# Patient Record
Sex: MALE | Race: WHITE | NOT HISPANIC OR LATINO | Employment: FULL TIME | URBAN - METROPOLITAN AREA
[De-identification: names, ages, dates, MRNs, and addresses within clinical notes are randomized per-mention and may not be internally consistent; named-entity substitution may affect disease eponyms.]

---

## 2023-10-11 ENCOUNTER — APPOINTMENT (OUTPATIENT)
Dept: PREADMISSION TESTING | Facility: HOSPITAL | Age: 53
End: 2023-10-11

## 2023-11-03 ENCOUNTER — TELEMEDICINE CLINICAL SUPPORT (OUTPATIENT)
Dept: PREADMISSION TESTING | Facility: HOSPITAL | Age: 53
End: 2023-11-03
Payer: MEDICAID

## 2023-11-13 RX ORDER — METOPROLOL SUCCINATE 200 MG/1
200 TABLET, EXTENDED RELEASE ORAL DAILY
COMMUNITY

## 2023-11-13 RX ORDER — LISINOPRIL AND HYDROCHLOROTHIAZIDE 12.5; 2 MG/1; MG/1
1 TABLET ORAL DAILY
COMMUNITY

## 2023-11-13 RX ORDER — ASPIRIN 81 MG/1
81 TABLET ORAL
COMMUNITY
Start: 2020-04-18

## 2023-11-13 RX ORDER — PANTOPRAZOLE SODIUM 40 MG/1
40 TABLET, DELAYED RELEASE ORAL
COMMUNITY

## 2023-11-13 RX ORDER — EZETIMIBE 10 MG/1
10 TABLET ORAL DAILY
COMMUNITY

## 2023-11-13 RX ORDER — ATORVASTATIN CALCIUM 80 MG/1
80 TABLET, FILM COATED ORAL NIGHTLY
COMMUNITY

## 2023-11-15 ENCOUNTER — PRE-ADMISSION TESTING (OUTPATIENT)
Dept: PREADMISSION TESTING | Facility: HOSPITAL | Age: 53
End: 2023-11-15
Payer: MEDICAID

## 2023-11-15 ENCOUNTER — HOSPITAL ENCOUNTER (OUTPATIENT)
Dept: CARDIOLOGY | Facility: HOSPITAL | Age: 53
Discharge: HOME | End: 2023-11-15
Payer: MEDICAID

## 2023-11-15 VITALS
BODY MASS INDEX: 24.92 KG/M2 | TEMPERATURE: 98.2 F | WEIGHT: 178 LBS | HEIGHT: 71 IN | HEART RATE: 68 BPM | SYSTOLIC BLOOD PRESSURE: 105 MMHG | DIASTOLIC BLOOD PRESSURE: 73 MMHG

## 2023-11-15 DIAGNOSIS — M27.2 OSTEOMYELITIS OF MANDIBLE: Primary | ICD-10-CM

## 2023-11-15 DIAGNOSIS — M27.2 OSTEOMYELITIS OF MANDIBLE: ICD-10-CM

## 2023-11-15 LAB
ABO GROUP (TYPE) IN BLOOD: NORMAL
ANION GAP SERPL CALC-SCNC: 15 MMOL/L (ref 10–20)
ANTIBODY SCREEN: NORMAL
BUN SERPL-MCNC: 8 MG/DL (ref 6–23)
CALCIUM SERPL-MCNC: 9.3 MG/DL (ref 8.6–10.6)
CHLORIDE SERPL-SCNC: 103 MMOL/L (ref 98–107)
CO2 SERPL-SCNC: 27 MMOL/L (ref 21–32)
CREAT SERPL-MCNC: 0.77 MG/DL (ref 0.5–1.3)
ERYTHROCYTE [DISTWIDTH] IN BLOOD BY AUTOMATED COUNT: 14.6 % (ref 11.5–14.5)
GFR SERPL CREATININE-BSD FRML MDRD: >90 ML/MIN/1.73M*2
GLUCOSE SERPL-MCNC: 81 MG/DL (ref 74–99)
HCT VFR BLD AUTO: 40.6 % (ref 41–52)
HGB BLD-MCNC: 13.2 G/DL (ref 13.5–17.5)
MCH RBC QN AUTO: 32.1 PG (ref 26–34)
MCHC RBC AUTO-ENTMCNC: 32.5 G/DL (ref 32–36)
MCV RBC AUTO: 99 FL (ref 80–100)
NRBC BLD-RTO: 0 /100 WBCS (ref 0–0)
PLATELET # BLD AUTO: 265 X10*3/UL (ref 150–450)
POTASSIUM SERPL-SCNC: 3.8 MMOL/L (ref 3.5–5.3)
RBC # BLD AUTO: 4.11 X10*6/UL (ref 4.5–5.9)
RH FACTOR (ANTIGEN D): NORMAL
SODIUM SERPL-SCNC: 141 MMOL/L (ref 136–145)
WBC # BLD AUTO: 4.6 X10*3/UL (ref 4.4–11.3)

## 2023-11-15 PROCEDURE — 86900 BLOOD TYPING SEROLOGIC ABO: CPT

## 2023-11-15 PROCEDURE — 93010 ELECTROCARDIOGRAM REPORT: CPT | Performed by: INTERNAL MEDICINE

## 2023-11-15 PROCEDURE — 36415 COLL VENOUS BLD VENIPUNCTURE: CPT

## 2023-11-15 PROCEDURE — 93005 ELECTROCARDIOGRAM TRACING: CPT

## 2023-11-15 PROCEDURE — 99205 OFFICE O/P NEW HI 60 MIN: CPT | Performed by: NURSE PRACTITIONER

## 2023-11-15 PROCEDURE — 85027 COMPLETE CBC AUTOMATED: CPT

## 2023-11-15 PROCEDURE — 80048 BASIC METABOLIC PNL TOTAL CA: CPT

## 2023-11-15 ASSESSMENT — LIFESTYLE VARIABLES: SMOKING_STATUS: SMOKER

## 2023-11-15 ASSESSMENT — ENCOUNTER SYMPTOMS
CARDIOVASCULAR NEGATIVE: 1
ENDOCRINE NEGATIVE: 1
GASTROINTESTINAL NEGATIVE: 1
NEUROLOGICAL NEGATIVE: 1
COUGH: 1
CONSTITUTIONAL NEGATIVE: 1
MUSCULOSKELETAL NEGATIVE: 1
NECK NEGATIVE: 1

## 2023-11-15 ASSESSMENT — CHADS2 SCORE
HYPERTENSION: YES
AGE GREATER THAN OR EQUAL TO 75: NO
DIABETES: NO
PRIOR STROKE OR TIA OR THROMBOEMBOLISM: NO
CHF: YES
CHADS2 SCORE: 2

## 2023-11-15 ASSESSMENT — DUKE ACTIVITY SCORE INDEX (DASI)
DASI METS SCORE: 9.9
CAN YOU DO YARD WORK LIKE RAKING LEAVES, WEEDING OR PUSHING A MOWER: YES
CAN YOU DO HEAVY WORK AROUND THE HOUSE LIKE SCRUBBING FLOORS OR LIFTING AND MOVING HEAVY FURNITURE: YES
CAN YOU WALK A BLOCK OR TWO ON LEVEL GROUND: YES
CAN YOU HAVE SEXUAL RELATIONS: YES
CAN YOU DO LIGHT WORK AROUND THE HOUSE LIKE DUSTING OR WASHING DISHES: YES
CAN YOU TAKE CARE OF YOURSELF (EAT, DRESS, BATHE, OR USE TOILET): YES
CAN YOU WALK INDOORS, SUCH AS AROUND YOUR HOUSE: YES
CAN YOU PARTICIPATE IN MODERATE RECREATIONAL ACTIVITIES LIKE GOLF, BOWLING, DANCING, DOUBLES TENNIS OR THROWING A BASEBALL OR FOOTBALL: YES
CAN YOU PARTICIPATE IN STRENOUS SPORTS LIKE SWIMMING, SINGLES TENNIS, FOOTBALL, BASKETBALL, OR SKIING: YES
CAN YOU CLIMB A FLIGHT OF STAIRS OR WALK UP A HILL: YES
CAN YOU RUN A SHORT DISTANCE: YES
CAN YOU DO MODERATE WORK AROUND THE HOUSE LIKE VACUUMING, SWEEPING FLOORS OR CARRYING GROCERIES: YES
TOTAL_SCORE: 58.2

## 2023-11-15 NOTE — CPM/PAT H&P
CPM/PAT Evaluation       Name: Steven Olivia Jr. (Steven Olivia Jr.)  /Age: 1970/52 y.o.     Visit Type:   In-Person       Chief Complaint: Osteomyelitis, jaw chronic    HPI  Patient is a 52-year-old male with a past medical history significant for HTN, HLD, CAD status post CABG, MI in , decompensated heart failure, hearing loss, and osteomyelitis of the jaw.  Patient is being evaluated in CPM in anticipation of Debridement of mandible and placement of custom reconstruction plate with Dr. Marinelli on 23.    Past Medical History:   Diagnosis Date    Alcoholism (CMS/[facility])     Cardiomyopathy (CMS/[facility]) 2020    Carotid stenosis 2020    CHF (congestive heart failure) (CMS/[facility])     Coronary artery disease 2020    s/p CABGx3, Last ECG: Sinus Rhythm WNL on 2023 by Dr. Lei Pathak    HL (hearing loss)     Hyperlipidemia     Hypertension     ICAO (internal carotid artery occlusion), right     right ICA occlusion, 50-59% stenosis of left ICA    Myocardial infarction (CMS/[facility])         Vision loss     glasses       Past Surgical History:   Procedure Laterality Date    CARDIAC CATHETERIZATION      2022 and 2020    COLECTOMY      COLONOSCOPY      CORONARY ARTERY BYPASS GRAFT  2020    CABGx3 with Dr. Sanchez       Patient Sexual activity questions deferred to the physician.    Family History   Problem Relation Name Age of Onset    Heart disease Mother      Heart disease Father         No Known Allergies    Prior to Admission medications    Medication Sig Start Date End Date Taking? Authorizing Provider   aspirin 81 mg EC tablet Take 1 tablet (81 mg) by mouth once daily. 20  Yes Historical Provider, MD   atorvastatin (Lipitor) 80 mg tablet Take 1 tablet (80 mg) by mouth once daily at bedtime.   Yes Historical Provider, MD   ezetimibe (Zetia) 10 mg tablet Take 1 tablet (10 mg) by mouth once daily.   Yes Historical Provider, MD   lisinopriL-hydrochlorothiazide 20-12.5 mg tablet  Take 1 tablet by mouth once daily.   Yes Historical Provider, MD   metoprolol succinate XL (Toprol-XL) 200 mg 24 hr tablet Take 1 tablet (200 mg) by mouth once daily. DO NOT CRUSH OR CHEW   Yes Historical Provider, MD   pantoprazole (ProtoNix) 40 mg EC tablet Take 1 tablet (40 mg) by mouth once daily in the morning. Take before meals.   Yes Historical Provider, MD        PAT ROS:   Constitutional:   neg    Neuro/Psych:   neg    Eyes:    use of corrective lenses  Ears:    hearing loss  Nose:   neg    Mouth:    dental issues  Throat:   neg    Neck:   neg    Cardio:   neg    Respiratory:    cough  Endocrine:   neg    GI:   neg    :   neg    Musculoskeletal:   neg    Hematologic:   neg    Skin:  neg        Physical Exam  Vitals reviewed. Physical exam within normal limits.          PAT AIRWAY:   Airway:     Mallampati::  II    TM distance::  <3 FB    Neck ROM::  Full      Visit Vitals  /73   Pulse 68   Temp 36.8 °C (98.2 °F)       DASI Risk Score      Flowsheet Row Most Recent Value   DASI SCORE 58.2   METS Score (Will be calculated only when all the questions are answered) 9.9          Caprini DVT Assessment      Flowsheet Row Most Recent Value   DVT Score 5   Current Status Major surgery planned, including arthroscopic and laproscopic (1-2 hours)   History Prior major surgery   Age 40-59 years   BMI 30 or less          Modified Frailty Index    No data to display       CHADS2 Stroke Risk  Current as of 15 minutes ago        2.8% 3 - 100%: High Risk   2 - 3%: Medium Risk   0 - 2%: Low Risk     No Change          This score determines the patient's risk of having a stroke if the patient has atrial fibrillation.          Points Metrics   0 Has Congestive Heart Failure:  No     Patients with congestive heart failure get 1 point.    Current as of 15 minutes ago   1 Has Hypertension:  Yes     Patients with hypertension get 1 point.    Current as of 15 minutes ago   0 Age:  52     Patients who are 75 years of age or  older get 1 point.    Current as of 15 minutes ago   0 Has Diabetes:  No     Patients with diabetes get 1 point.    Current as of 15 minutes ago   0 Had Stroke:  No  Had TIA:  No  Had Thromboembolism:  No     Patients who have had a stroke, TIA, or thromboembolism get 2 points.    Current as of 15 minutes ago             Revised Cardiac Risk Index      Flowsheet Row Most Recent Value   Revised Cardiac Risk Calculator 2          Apfel Simplified Score      Flowsheet Row Most Recent Value   Apfel Simplified Score Calculator 0          Risk Analysis Index Results This Encounter    No data found in the last 1 encounters.       Stop Bang Score      Flowsheet Row Most Recent Value   Do you snore loudly? 0   Do you often feel tired or fatigued after your sleep? 0   Has anyone ever observed you stop breathing in your sleep? 0   Do you have or are you being treated for high blood pressure? 1   Recent BMI (Calculated) 24.8   Is BMI greater than 35 kg/m2? 0=No   Age older than 50 years old? 1=Yes   Is your neck circumference greater than 17 inches (Male) or 16 inches (Female)? 0   Gender - Male 1=Yes   STOP-BANG Total Score 3            Assessment and Plan:     Neuro:   The patient has no neurological diagnoses or significant findings on chart review, clinical presentation, and evaluation.  No grossly apparent perioperative risk.    HEENT/Airway  No diagnoses, significant findings on chart review, clinical presentation, or evaluation.    Cardiovascular  The patient is scheduled for non-cardiac surgery associated with elevated risk.  The patient has no major cardiac contraindications to non- cardiac surgery.  RCRI  The patient meets 3 or more RCRI criteria and therefore is at high risk for major adverse cardiac complications.  METS  The patient's functional capacity capacity is greater than 4 METS.  EKG  The patient has no EKG or echocardiographic changes concerning for myocardial ischemia.  No further cardiac evaluation is  indicated  Heart Failure  The patient has no known history of heart failure.  Additionally, the patient reports no symptoms of heart failure and demonstrates no signs of heart failure.  Hypertension Evaluation  The patient has a known history of hypertension that is controlled.  Patient's hypertension is most consistent with stage 1.  Heart Rhythm Evaluation  The patient has no history of arrhythmias.  Heart Valve Evaluation  The patient has no known history of valvular heart disease. The patient has no symptoms or physical exam findings to suggest valvular heart disease.  CARDS EVAL  The patient follows with cardiology, Dr. Pathak. Patient was last seen 7-26-23. Per note, continue beta blocker in the perioperative period, continue statin therapy and aspirin follow up in 1 year.  The patient has a 30-day risk for MACE of 3 or greater predictors, 15% risk for cardiac death, nonfatal myocardial infarction, and nonfatal cardiac arrest.  CALEB score which indicates a 0.1% risk of intraoperative or 30-day postoperative.    Pulmonary   No significant findings on chart review or clinical presentation and evaluation.  The patient has a stop bang score of 3, which places patient at intermediate risk for having FRANKLIN.  ARISCAT 19, low, 1.6% risk of in-hospital postoperative pulmonary complications  PRODIGY 8, low of respiratory depression episode.    Hematology  No diagnoses or significant findings on chart review or clinical presentation and evaluation.  Antiplatelet management   The patient is currently receiving antiplatelet therapy for CAD.  Anticoagulation management  The patient is not currently receiving anticoagulation therapy.  Caprini score 5, high risk of VTE  Transfusion Evaluation  A type and screen was obtained given the likelihood for perioperative transfusion of blood or blood products.    GI  No diagnoses or significant findings on chart review or clinical presentation and evaluation.  Eat 10- 0,  self-perceived  oropharyngeal dysphagia scale (0-40)     Genitourinary  No diagnoses or significant findings on chart review or clinical presentation and evaluation.    Renal  The patient has no known history of chronic kidney disease.    Musculoskeletal  No diagnoses or significant findings on chart review or clinical presentation and evaluation.    Endocrine  Diabetes Evaluation  The patient has no history of diabetes mellitus  Thyroid Disease Evaluation  The patient has no history of thyroid disease.    ID  No diagnoses or significant findings on chart review or clinical presentation and evaluation.    -Preoperative medication instructions were provided and reviewed with the patient.  Any additional testing or evaluation was explained to the patient.  NPO Instructions were discussed, and the patient's questions were answered prior to conclusion of this encounter

## 2023-11-15 NOTE — PREPROCEDURE INSTRUCTIONS
NPO Instructions:    Do not eat any food after midnight the night before your surgery/procedure.  You may have clear liquids until TWO hours before surgery/procedure. This includes water, black tea/coffee, (no milk or cream) apple juice and electrolyte drinks (Gatorade).  You may chew gum up to TWO hours before your surgery/procedure.    Additional Instructions:     The Day before Surgery:  Review your medication instructions, stop indicated medications  You will be contacted regarding the time of your arrival to facility and surgery time  Do not eat any food after Midnight  Day of Surgery:  Review your medication instructions, take indicated medications  You may have clear liquids until TWO hours before surgery/procedure.  This includes water, black tea/coffee, (no milk or cream) apple juice and electrolyte drinks (Gatorade)  You may chew gum up to TWO hours before your surgery/procedure  Wear  comfortable loose fitting clothing  Do not use moisturizers, creams, lotions or perfume  All jewelry and valuables should be left at home

## 2023-11-16 ENCOUNTER — ANESTHESIA (OUTPATIENT)
Dept: OPERATING ROOM | Facility: HOSPITAL | Age: 53
End: 2023-11-16
Payer: MEDICAID

## 2023-11-16 ENCOUNTER — HOSPITAL ENCOUNTER (OUTPATIENT)
Facility: HOSPITAL | Age: 53
LOS: 1 days | Discharge: HOME | End: 2023-11-17
Attending: DENTIST | Admitting: DENTIST
Payer: MEDICAID

## 2023-11-16 ENCOUNTER — APPOINTMENT (OUTPATIENT)
Dept: RADIOLOGY | Facility: HOSPITAL | Age: 53
End: 2023-11-16
Payer: MEDICAID

## 2023-11-16 ENCOUNTER — ANESTHESIA EVENT (OUTPATIENT)
Dept: OPERATING ROOM | Facility: HOSPITAL | Age: 53
End: 2023-11-16
Payer: MEDICAID

## 2023-11-16 DIAGNOSIS — M27.2 OSTEOMYELITIS, JAW CHRONIC: Primary | ICD-10-CM

## 2023-11-16 PROBLEM — I25.10 CAD (CORONARY ARTERY DISEASE): Status: ACTIVE | Noted: 2023-11-16

## 2023-11-16 PROBLEM — I65.29 CAROTID STENOSIS: Status: ACTIVE | Noted: 2023-11-16

## 2023-11-16 PROBLEM — I10 PRIMARY HYPERTENSION: Status: ACTIVE | Noted: 2023-11-16

## 2023-11-16 PROBLEM — I42.9 CARDIOMYOPATHY (MULTI): Status: ACTIVE | Noted: 2023-11-16

## 2023-11-16 PROBLEM — I21.9 MYOCARDIAL INFARCTION (MULTI): Status: ACTIVE | Noted: 2023-11-16

## 2023-11-16 PROBLEM — I65.29 STENOSIS OF CAROTID ARTERY: Status: ACTIVE | Noted: 2023-11-16

## 2023-11-16 PROBLEM — F10.20 ALCOHOLISM (MULTI): Status: ACTIVE | Noted: 2023-11-16

## 2023-11-16 PROBLEM — I25.10 CORONARY ARTERY DISEASE: Status: ACTIVE | Noted: 2023-11-16

## 2023-11-16 PROBLEM — I50.9 CHF (CONGESTIVE HEART FAILURE) (MULTI): Status: ACTIVE | Noted: 2023-11-16

## 2023-11-16 PROBLEM — E78.5 HYPERLIPIDEMIA: Status: ACTIVE | Noted: 2023-11-16

## 2023-11-16 PROBLEM — I10 HYPERTENSION: Status: ACTIVE | Noted: 2023-11-16

## 2023-11-16 PROBLEM — H54.7 VISION LOSS: Status: ACTIVE | Noted: 2023-11-16

## 2023-11-16 LAB
ATRIAL RATE: 64 BPM
P AXIS: 72 DEGREES
P OFFSET: 202 MS
P ONSET: 153 MS
PR INTERVAL: 150 MS
Q ONSET: 228 MS
QRS COUNT: 11 BEATS
QRS DURATION: 78 MS
QT INTERVAL: 428 MS
QTC CALCULATION(BAZETT): 441 MS
QTC FREDERICIA: 437 MS
R AXIS: 68 DEGREES
T AXIS: 52 DEGREES
T OFFSET: 442 MS
VENTRICULAR RATE: 64 BPM

## 2023-11-16 PROCEDURE — A4217 STERILE WATER/SALINE, 500 ML: HCPCS | Mod: SE | Performed by: DENTIST

## 2023-11-16 PROCEDURE — 70355 PANORAMIC X-RAY OF JAWS: CPT | Mod: FY

## 2023-11-16 PROCEDURE — 2500000004 HC RX 250 GENERAL PHARMACY W/ HCPCS (ALT 636 FOR OP/ED)

## 2023-11-16 PROCEDURE — 88312 SPECIAL STAINS GROUP 1: CPT | Mod: TC,SUR | Performed by: DENTIST

## 2023-11-16 PROCEDURE — 1100000001 HC PRIVATE ROOM DAILY

## 2023-11-16 PROCEDURE — 2500000004 HC RX 250 GENERAL PHARMACY W/ HCPCS (ALT 636 FOR OP/ED): Mod: SE | Performed by: DENTIST

## 2023-11-16 PROCEDURE — 88305 TISSUE EXAM BY PATHOLOGIST: CPT | Performed by: PATHOLOGY

## 2023-11-16 PROCEDURE — 2500000001 HC RX 250 WO HCPCS SELF ADMINISTERED DRUGS (ALT 637 FOR MEDICARE OP): Mod: SE | Performed by: DENTIST

## 2023-11-16 PROCEDURE — 2500000001 HC RX 250 WO HCPCS SELF ADMINISTERED DRUGS (ALT 637 FOR MEDICARE OP): Performed by: STUDENT IN AN ORGANIZED HEALTH CARE EDUCATION/TRAINING PROGRAM

## 2023-11-16 PROCEDURE — 70355 PANORAMIC X-RAY OF JAWS: CPT | Performed by: STUDENT IN AN ORGANIZED HEALTH CARE EDUCATION/TRAINING PROGRAM

## 2023-11-16 PROCEDURE — 88311 DECALCIFY TISSUE: CPT | Performed by: PATHOLOGY

## 2023-11-16 PROCEDURE — 3700000001 HC GENERAL ANESTHESIA TIME - INITIAL BASE CHARGE: Performed by: DENTIST

## 2023-11-16 PROCEDURE — 2500000004 HC RX 250 GENERAL PHARMACY W/ HCPCS (ALT 636 FOR OP/ED): Mod: SE | Performed by: ANESTHESIOLOGIST ASSISTANT

## 2023-11-16 PROCEDURE — 88307 TISSUE EXAM BY PATHOLOGIST: CPT | Mod: TC,SUR | Performed by: DENTIST

## 2023-11-16 PROCEDURE — 94640 AIRWAY INHALATION TREATMENT: CPT | Mod: 59 | Performed by: ANESTHESIOLOGIST ASSISTANT

## 2023-11-16 PROCEDURE — 3600000008 HC OR TIME - EACH INCREMENTAL 1 MINUTE - PROCEDURE LEVEL THREE: Performed by: DENTIST

## 2023-11-16 PROCEDURE — 87070 CULTURE OTHR SPECIMN AEROBIC: CPT | Performed by: DENTIST

## 2023-11-16 PROCEDURE — 2780000003 HC OR 278 NO HCPCS: Performed by: DENTIST

## 2023-11-16 PROCEDURE — C1776 JOINT DEVICE (IMPLANTABLE): HCPCS | Performed by: DENTIST

## 2023-11-16 PROCEDURE — 7100000001 HC RECOVERY ROOM TIME - INITIAL BASE CHARGE: Performed by: DENTIST

## 2023-11-16 PROCEDURE — 2500000005 HC RX 250 GENERAL PHARMACY W/O HCPCS

## 2023-11-16 PROCEDURE — 88307 TISSUE EXAM BY PATHOLOGIST: CPT | Performed by: PATHOLOGY

## 2023-11-16 PROCEDURE — 2500000005 HC RX 250 GENERAL PHARMACY W/O HCPCS: Mod: SE | Performed by: ANESTHESIOLOGIST ASSISTANT

## 2023-11-16 PROCEDURE — 7100000002 HC RECOVERY ROOM TIME - EACH INCREMENTAL 1 MINUTE: Performed by: DENTIST

## 2023-11-16 PROCEDURE — 2500000002 HC RX 250 W HCPCS SELF ADMINISTERED DRUGS (ALT 637 FOR MEDICARE OP, ALT 636 FOR OP/ED): Mod: SE | Performed by: ANESTHESIOLOGIST ASSISTANT

## 2023-11-16 PROCEDURE — 87075 CULTR BACTERIA EXCEPT BLOOD: CPT | Performed by: DENTIST

## 2023-11-16 PROCEDURE — A4649 SURGICAL SUPPLIES: HCPCS | Performed by: DENTIST

## 2023-11-16 PROCEDURE — 2500000004 HC RX 250 GENERAL PHARMACY W/ HCPCS (ALT 636 FOR OP/ED): Performed by: STUDENT IN AN ORGANIZED HEALTH CARE EDUCATION/TRAINING PROGRAM

## 2023-11-16 PROCEDURE — 2720000007 HC OR 272 NO HCPCS: Performed by: DENTIST

## 2023-11-16 PROCEDURE — 3700000002 HC GENERAL ANESTHESIA TIME - EACH INCREMENTAL 1 MINUTE: Performed by: DENTIST

## 2023-11-16 PROCEDURE — 2500000004 HC RX 250 GENERAL PHARMACY W/ HCPCS (ALT 636 FOR OP/ED): Performed by: ANESTHESIOLOGY

## 2023-11-16 PROCEDURE — 88305 TISSUE EXAM BY PATHOLOGIST: CPT | Mod: TC,SUR | Performed by: DENTIST

## 2023-11-16 PROCEDURE — C1713 ANCHOR/SCREW BN/BN,TIS/BN: HCPCS | Performed by: DENTIST

## 2023-11-16 PROCEDURE — 3600000003 HC OR TIME - INITIAL BASE CHARGE - PROCEDURE LEVEL THREE: Performed by: DENTIST

## 2023-11-16 DEVICE — IMPLANTABLE DEVICE: Type: IMPLANTABLE DEVICE | Site: MANDIBLE | Status: FUNCTIONAL

## 2023-11-16 DEVICE — IMPLANTABLE DEVICE: Type: IMPLANTABLE DEVICE | Site: MANDIBLE | Status: NON-FUNCTIONAL

## 2023-11-16 DEVICE — DRILL, KLSM 2.2: Type: IMPLANTABLE DEVICE | Site: MANDIBLE | Status: NON-FUNCTIONAL

## 2023-11-16 RX ORDER — NALOXONE HYDROCHLORIDE 0.4 MG/ML
0.2 INJECTION, SOLUTION INTRAMUSCULAR; INTRAVENOUS; SUBCUTANEOUS EVERY 5 MIN PRN
Status: DISCONTINUED | OUTPATIENT
Start: 2023-11-16 | End: 2023-11-17 | Stop reason: HOSPADM

## 2023-11-16 RX ORDER — NORETHINDRONE AND ETHINYL ESTRADIOL 0.5-0.035
KIT ORAL AS NEEDED
Status: DISCONTINUED | OUTPATIENT
Start: 2023-11-16 | End: 2023-11-16

## 2023-11-16 RX ORDER — HYDROMORPHONE HYDROCHLORIDE 1 MG/ML
INJECTION, SOLUTION INTRAMUSCULAR; INTRAVENOUS; SUBCUTANEOUS AS NEEDED
Status: DISCONTINUED | OUTPATIENT
Start: 2023-11-16 | End: 2023-11-16

## 2023-11-16 RX ORDER — ASPIRIN 81 MG/1
81 TABLET ORAL
Status: DISCONTINUED | OUTPATIENT
Start: 2023-11-17 | End: 2023-11-17 | Stop reason: HOSPADM

## 2023-11-16 RX ORDER — FENTANYL CITRATE 50 UG/ML
INJECTION, SOLUTION INTRAMUSCULAR; INTRAVENOUS AS NEEDED
Status: DISCONTINUED | OUTPATIENT
Start: 2023-11-16 | End: 2023-11-16

## 2023-11-16 RX ORDER — MIDAZOLAM HYDROCHLORIDE 1 MG/ML
INJECTION INTRAMUSCULAR; INTRAVENOUS AS NEEDED
Status: DISCONTINUED | OUTPATIENT
Start: 2023-11-16 | End: 2023-11-16

## 2023-11-16 RX ORDER — ENOXAPARIN SODIUM 100 MG/ML
40 INJECTION SUBCUTANEOUS
Status: DISCONTINUED | OUTPATIENT
Start: 2023-11-17 | End: 2023-11-17 | Stop reason: HOSPADM

## 2023-11-16 RX ORDER — SODIUM CHLORIDE 9 MG/ML
INJECTION, SOLUTION INTRAVENOUS CONTINUOUS PRN
Status: COMPLETED | OUTPATIENT
Start: 2023-11-16 | End: 2023-11-16

## 2023-11-16 RX ORDER — SUCCINYLCHOLINE CHLORIDE 20 MG/ML
INJECTION INTRAMUSCULAR; INTRAVENOUS AS NEEDED
Status: DISCONTINUED | OUTPATIENT
Start: 2023-11-16 | End: 2023-11-16

## 2023-11-16 RX ORDER — CEFAZOLIN SODIUM 2 G/50ML
SOLUTION INTRAVENOUS AS NEEDED
Status: DISCONTINUED | OUTPATIENT
Start: 2023-11-16 | End: 2023-11-16

## 2023-11-16 RX ORDER — ATORVASTATIN CALCIUM 80 MG/1
80 TABLET, FILM COATED ORAL NIGHTLY
Status: DISCONTINUED | OUTPATIENT
Start: 2023-11-16 | End: 2023-11-17 | Stop reason: HOSPADM

## 2023-11-16 RX ORDER — OXYCODONE HYDROCHLORIDE 5 MG/1
10 TABLET ORAL EVERY 4 HOURS PRN
Status: DISCONTINUED | OUTPATIENT
Start: 2023-11-16 | End: 2023-11-17 | Stop reason: HOSPADM

## 2023-11-16 RX ORDER — LIDOCAINE HYDROCHLORIDE 10 MG/ML
0.1 INJECTION, SOLUTION EPIDURAL; INFILTRATION; INTRACAUDAL; PERINEURAL ONCE
Status: DISCONTINUED | OUTPATIENT
Start: 2023-11-16 | End: 2023-11-16 | Stop reason: HOSPADM

## 2023-11-16 RX ORDER — SODIUM CHLORIDE, SODIUM LACTATE, POTASSIUM CHLORIDE, CALCIUM CHLORIDE 600; 310; 30; 20 MG/100ML; MG/100ML; MG/100ML; MG/100ML
50 INJECTION, SOLUTION INTRAVENOUS CONTINUOUS
Status: DISCONTINUED | OUTPATIENT
Start: 2023-11-16 | End: 2023-11-16 | Stop reason: HOSPADM

## 2023-11-16 RX ORDER — SODIUM CHLORIDE 0.9 G/100ML
IRRIGANT IRRIGATION AS NEEDED
Status: DISCONTINUED | OUTPATIENT
Start: 2023-11-16 | End: 2023-11-16 | Stop reason: HOSPADM

## 2023-11-16 RX ORDER — METOPROLOL SUCCINATE 25 MG/1
200 TABLET, EXTENDED RELEASE ORAL DAILY
Status: DISCONTINUED | OUTPATIENT
Start: 2023-11-17 | End: 2023-11-17 | Stop reason: HOSPADM

## 2023-11-16 RX ORDER — ONDANSETRON HYDROCHLORIDE 2 MG/ML
INJECTION, SOLUTION INTRAVENOUS AS NEEDED
Status: DISCONTINUED | OUTPATIENT
Start: 2023-11-16 | End: 2023-11-16

## 2023-11-16 RX ORDER — PHENYLEPHRINE HCL IN 0.9% NACL 0.4MG/10ML
SYRINGE (ML) INTRAVENOUS AS NEEDED
Status: DISCONTINUED | OUTPATIENT
Start: 2023-11-16 | End: 2023-11-16

## 2023-11-16 RX ORDER — ALBUTEROL SULFATE 90 UG/1
AEROSOL, METERED RESPIRATORY (INHALATION) AS NEEDED
Status: DISCONTINUED | OUTPATIENT
Start: 2023-11-16 | End: 2023-11-16

## 2023-11-16 RX ORDER — DEXAMETHASONE SODIUM PHOSPHATE 4 MG/ML
INJECTION, SOLUTION INTRA-ARTICULAR; INTRALESIONAL; INTRAMUSCULAR; INTRAVENOUS; SOFT TISSUE AS NEEDED
Status: DISCONTINUED | OUTPATIENT
Start: 2023-11-16 | End: 2023-11-16

## 2023-11-16 RX ORDER — REMIFENTANIL HYDROCHLORIDE 1 MG/ML
INJECTION, POWDER, LYOPHILIZED, FOR SOLUTION INTRAVENOUS CONTINUOUS PRN
Status: DISCONTINUED | OUTPATIENT
Start: 2023-11-16 | End: 2023-11-16

## 2023-11-16 RX ORDER — SODIUM CHLORIDE, SODIUM LACTATE, POTASSIUM CHLORIDE, CALCIUM CHLORIDE 600; 310; 30; 20 MG/100ML; MG/100ML; MG/100ML; MG/100ML
125 INJECTION, SOLUTION INTRAVENOUS CONTINUOUS
Status: DISCONTINUED | OUTPATIENT
Start: 2023-11-16 | End: 2023-11-17 | Stop reason: HOSPADM

## 2023-11-16 RX ORDER — SODIUM CHLORIDE, SODIUM LACTATE, POTASSIUM CHLORIDE, CALCIUM CHLORIDE 600; 310; 30; 20 MG/100ML; MG/100ML; MG/100ML; MG/100ML
INJECTION, SOLUTION INTRAVENOUS CONTINUOUS PRN
Status: DISCONTINUED | OUTPATIENT
Start: 2023-11-16 | End: 2023-11-16

## 2023-11-16 RX ORDER — HYDROMORPHONE HYDROCHLORIDE 1 MG/ML
0.5 INJECTION, SOLUTION INTRAMUSCULAR; INTRAVENOUS; SUBCUTANEOUS EVERY 5 MIN PRN
Status: DISCONTINUED | OUTPATIENT
Start: 2023-11-16 | End: 2023-11-16 | Stop reason: HOSPADM

## 2023-11-16 RX ORDER — LIDOCAINE HCL/PF 100 MG/5ML
SYRINGE (ML) INTRAVENOUS AS NEEDED
Status: DISCONTINUED | OUTPATIENT
Start: 2023-11-16 | End: 2023-11-16

## 2023-11-16 RX ORDER — HYDROMORPHONE HYDROCHLORIDE 1 MG/ML
0.2 INJECTION, SOLUTION INTRAMUSCULAR; INTRAVENOUS; SUBCUTANEOUS ONCE
Status: COMPLETED | OUTPATIENT
Start: 2023-11-16 | End: 2023-11-16

## 2023-11-16 RX ORDER — ACETAMINOPHEN 160 MG/5ML
650 SOLUTION ORAL EVERY 6 HOURS
Status: DISCONTINUED | OUTPATIENT
Start: 2023-11-16 | End: 2023-11-17 | Stop reason: HOSPADM

## 2023-11-16 RX ORDER — OXYCODONE HYDROCHLORIDE 5 MG/1
5 TABLET ORAL EVERY 6 HOURS PRN
Status: DISCONTINUED | OUTPATIENT
Start: 2023-11-16 | End: 2023-11-17 | Stop reason: HOSPADM

## 2023-11-16 RX ORDER — PANTOPRAZOLE SODIUM 40 MG/1
40 TABLET, DELAYED RELEASE ORAL
Status: DISCONTINUED | OUTPATIENT
Start: 2023-11-17 | End: 2023-11-17 | Stop reason: HOSPADM

## 2023-11-16 RX ORDER — MEPERIDINE HYDROCHLORIDE 25 MG/ML
12.5 INJECTION INTRAMUSCULAR; INTRAVENOUS; SUBCUTANEOUS EVERY 10 MIN PRN
Status: DISCONTINUED | OUTPATIENT
Start: 2023-11-16 | End: 2023-11-16 | Stop reason: HOSPADM

## 2023-11-16 RX ORDER — ONDANSETRON 4 MG/1
4 TABLET, FILM COATED ORAL EVERY 8 HOURS PRN
Status: DISCONTINUED | OUTPATIENT
Start: 2023-11-16 | End: 2023-11-17 | Stop reason: HOSPADM

## 2023-11-16 RX ORDER — ENOXAPARIN SODIUM 100 MG/ML
30 INJECTION SUBCUTANEOUS EVERY 12 HOURS
Status: DISCONTINUED | OUTPATIENT
Start: 2023-11-16 | End: 2023-11-16

## 2023-11-16 RX ORDER — DEXAMETHASONE SODIUM PHOSPHATE 100 MG/10ML
8 INJECTION INTRAMUSCULAR; INTRAVENOUS EVERY 8 HOURS SCHEDULED
Status: DISCONTINUED | OUTPATIENT
Start: 2023-11-16 | End: 2023-11-17 | Stop reason: HOSPADM

## 2023-11-16 RX ORDER — ACETAMINOPHEN 325 MG/1
650 TABLET ORAL EVERY 6 HOURS
Status: DISCONTINUED | OUTPATIENT
Start: 2023-11-16 | End: 2023-11-17 | Stop reason: HOSPADM

## 2023-11-16 RX ORDER — METOCLOPRAMIDE HYDROCHLORIDE 5 MG/ML
10 INJECTION INTRAMUSCULAR; INTRAVENOUS ONCE AS NEEDED
Status: DISCONTINUED | OUTPATIENT
Start: 2023-11-16 | End: 2023-11-16 | Stop reason: HOSPADM

## 2023-11-16 RX ORDER — ONDANSETRON HYDROCHLORIDE 2 MG/ML
4 INJECTION, SOLUTION INTRAVENOUS EVERY 8 HOURS PRN
Status: DISCONTINUED | OUTPATIENT
Start: 2023-11-16 | End: 2023-11-17 | Stop reason: HOSPADM

## 2023-11-16 RX ORDER — PROPOFOL 10 MG/ML
INJECTION, EMULSION INTRAVENOUS AS NEEDED
Status: DISCONTINUED | OUTPATIENT
Start: 2023-11-16 | End: 2023-11-16

## 2023-11-16 RX ORDER — ROCURONIUM BROMIDE 10 MG/ML
INJECTION, SOLUTION INTRAVENOUS AS NEEDED
Status: DISCONTINUED | OUTPATIENT
Start: 2023-11-16 | End: 2023-11-16

## 2023-11-16 RX ORDER — BACITRACIN 500 [USP'U]/G
OINTMENT TOPICAL AS NEEDED
Status: DISCONTINUED | OUTPATIENT
Start: 2023-11-16 | End: 2023-11-16 | Stop reason: HOSPADM

## 2023-11-16 RX ORDER — HYDROMORPHONE HYDROCHLORIDE 1 MG/ML
0.2 INJECTION, SOLUTION INTRAMUSCULAR; INTRAVENOUS; SUBCUTANEOUS EVERY 5 MIN PRN
Status: DISCONTINUED | OUTPATIENT
Start: 2023-11-16 | End: 2023-11-16 | Stop reason: HOSPADM

## 2023-11-16 RX ORDER — CHLORHEXIDINE GLUCONATE ORAL RINSE 1.2 MG/ML
15 SOLUTION DENTAL 2 TIMES DAILY
Status: DISCONTINUED | OUTPATIENT
Start: 2023-11-16 | End: 2023-11-17 | Stop reason: HOSPADM

## 2023-11-16 RX ADMIN — REMIFENTANIL HYDROCHLORIDE 0.1 MCG/KG/MIN: 1 INJECTION, POWDER, LYOPHILIZED, FOR SOLUTION INTRAVENOUS at 13:28

## 2023-11-16 RX ADMIN — CEFAZOLIN SODIUM 2 G: 2 SOLUTION INTRAVENOUS at 13:13

## 2023-11-16 RX ADMIN — HYDROMORPHONE HYDROCHLORIDE 0.8 MG: 1 INJECTION, SOLUTION INTRAMUSCULAR; INTRAVENOUS; SUBCUTANEOUS at 15:34

## 2023-11-16 RX ADMIN — DEXAMETHASONE SODIUM PHOSPHATE 8 MG: 10 INJECTION INTRAMUSCULAR; INTRAVENOUS at 21:59

## 2023-11-16 RX ADMIN — ONDANSETRON 4 MG: 2 INJECTION INTRAMUSCULAR; INTRAVENOUS at 15:03

## 2023-11-16 RX ADMIN — SODIUM CHLORIDE, POTASSIUM CHLORIDE, SODIUM LACTATE AND CALCIUM CHLORIDE 125 ML/HR: 600; 310; 30; 20 INJECTION, SOLUTION INTRAVENOUS at 18:36

## 2023-11-16 RX ADMIN — Medication 50 MG: at 13:06

## 2023-11-16 RX ADMIN — SUCCINYLCHOLINE CHLORIDE 150 MG: 20 INJECTION, SOLUTION INTRAMUSCULAR; INTRAVENOUS at 13:06

## 2023-11-16 RX ADMIN — Medication 80 MCG: at 13:34

## 2023-11-16 RX ADMIN — LIDOCAINE HYDROCHLORIDE 80 MG: 20 INJECTION INTRAVENOUS at 13:06

## 2023-11-16 RX ADMIN — ROCURONIUM BROMIDE 10 MG: 50 INJECTION, SOLUTION INTRAVENOUS at 13:06

## 2023-11-16 RX ADMIN — OXYCODONE HYDROCHLORIDE 10 MG: 5 TABLET ORAL at 21:23

## 2023-11-16 RX ADMIN — FENTANYL CITRATE 50 MCG: 50 INJECTION, SOLUTION INTRAMUSCULAR; INTRAVENOUS at 13:06

## 2023-11-16 RX ADMIN — MIDAZOLAM HYDROCHLORIDE 2 MG: 1 INJECTION, SOLUTION INTRAMUSCULAR; INTRAVENOUS at 13:00

## 2023-11-16 RX ADMIN — ROCURONIUM BROMIDE 40 MG: 50 INJECTION, SOLUTION INTRAVENOUS at 13:50

## 2023-11-16 RX ADMIN — DEXAMETHASONE SODIUM PHOSPHATE 10 MG: 4 INJECTION, SOLUTION INTRA-ARTICULAR; INTRALESIONAL; INTRAMUSCULAR; INTRAVENOUS; SOFT TISSUE at 13:25

## 2023-11-16 RX ADMIN — PROPOFOL 170 MG: 10 INJECTION, EMULSION INTRAVENOUS at 13:06

## 2023-11-16 RX ADMIN — SODIUM CHLORIDE, POTASSIUM CHLORIDE, SODIUM LACTATE AND CALCIUM CHLORIDE: 600; 310; 30; 20 INJECTION, SOLUTION INTRAVENOUS at 12:55

## 2023-11-16 RX ADMIN — EPHEDRINE SULFATE 20 MG: 50 INJECTION, SOLUTION INTRAVENOUS at 14:41

## 2023-11-16 RX ADMIN — EPHEDRINE SULFATE 20 MG: 50 INJECTION, SOLUTION INTRAVENOUS at 13:37

## 2023-11-16 RX ADMIN — EPHEDRINE SULFATE 10 MG: 50 INJECTION, SOLUTION INTRAVENOUS at 14:51

## 2023-11-16 RX ADMIN — ACETAMINOPHEN 650 MG: 650 SOLUTION ORAL at 18:32

## 2023-11-16 RX ADMIN — OXYCODONE HYDROCHLORIDE 10 MG: 5 TABLET ORAL at 16:45

## 2023-11-16 RX ADMIN — FENTANYL CITRATE 50 MCG: 50 INJECTION, SOLUTION INTRAMUSCULAR; INTRAVENOUS at 14:51

## 2023-11-16 RX ADMIN — CHLORHEXIDINE GLUCONATE 15 ML: 1.2 SOLUTION ORAL at 21:23

## 2023-11-16 RX ADMIN — HYDROMORPHONE HYDROCHLORIDE 0.5 MG: 1 INJECTION, SOLUTION INTRAMUSCULAR; INTRAVENOUS; SUBCUTANEOUS at 16:15

## 2023-11-16 RX ADMIN — SUGAMMADEX 200 MG: 100 INJECTION, SOLUTION INTRAVENOUS at 15:10

## 2023-11-16 RX ADMIN — PROPOFOL 30 MG: 10 INJECTION, EMULSION INTRAVENOUS at 13:25

## 2023-11-16 RX ADMIN — ALBUTEROL SULFATE 270 PUFF: 90 AEROSOL, METERED RESPIRATORY (INHALATION) at 13:05

## 2023-11-16 RX ADMIN — HYDROMORPHONE HYDROCHLORIDE 0.2 MG: 1 INJECTION, SOLUTION INTRAMUSCULAR; INTRAVENOUS; SUBCUTANEOUS at 18:32

## 2023-11-16 RX ADMIN — ATORVASTATIN CALCIUM 80 MG: 80 TABLET, FILM COATED ORAL at 21:23

## 2023-11-16 RX ADMIN — HYDROMORPHONE HYDROCHLORIDE 0.2 MG: 1 INJECTION, SOLUTION INTRAMUSCULAR; INTRAVENOUS; SUBCUTANEOUS at 15:02

## 2023-11-16 RX ADMIN — AMPICILLIN AND SULBACTAM 3 G: 2; 1 INJECTION, POWDER, FOR SOLUTION INTRAVENOUS at 21:24

## 2023-11-16 SDOH — SOCIAL STABILITY: SOCIAL INSECURITY: WERE YOU ABLE TO COMPLETE ALL THE BEHAVIORAL HEALTH SCREENINGS?: YES

## 2023-11-16 SDOH — SOCIAL STABILITY: SOCIAL INSECURITY: DOES ANYONE TRY TO KEEP YOU FROM HAVING/CONTACTING OTHER FRIENDS OR DOING THINGS OUTSIDE YOUR HOME?: NO

## 2023-11-16 SDOH — SOCIAL STABILITY: SOCIAL INSECURITY: ARE YOU OR HAVE YOU BEEN THREATENED OR ABUSED PHYSICALLY, EMOTIONALLY, OR SEXUALLY BY ANYONE?: NO

## 2023-11-16 SDOH — SOCIAL STABILITY: SOCIAL INSECURITY: HAS ANYONE EVER THREATENED TO HURT YOUR FAMILY OR YOUR PETS?: NO

## 2023-11-16 SDOH — HEALTH STABILITY: MENTAL HEALTH: CURRENT SMOKER: 1

## 2023-11-16 SDOH — SOCIAL STABILITY: SOCIAL INSECURITY: DO YOU FEEL ANYONE HAS EXPLOITED OR TAKEN ADVANTAGE OF YOU FINANCIALLY OR OF YOUR PERSONAL PROPERTY?: NO

## 2023-11-16 SDOH — SOCIAL STABILITY: SOCIAL INSECURITY: DO YOU FEEL UNSAFE GOING BACK TO THE PLACE WHERE YOU ARE LIVING?: NO

## 2023-11-16 SDOH — SOCIAL STABILITY: SOCIAL INSECURITY: ABUSE: ADULT

## 2023-11-16 SDOH — SOCIAL STABILITY: SOCIAL INSECURITY: ARE THERE ANY APPARENT SIGNS OF INJURIES/BEHAVIORS THAT COULD BE RELATED TO ABUSE/NEGLECT?: NO

## 2023-11-16 SDOH — SOCIAL STABILITY: SOCIAL INSECURITY: HAVE YOU HAD THOUGHTS OF HARMING ANYONE ELSE?: NO

## 2023-11-16 ASSESSMENT — ENCOUNTER SYMPTOMS
COUGH: 1
CARDIOVASCULAR NEGATIVE: 1
EYES NEGATIVE: 1
CONSTITUTIONAL NEGATIVE: 1

## 2023-11-16 ASSESSMENT — LIFESTYLE VARIABLES
HAS A RELATIVE, FRIEND, DOCTOR, OR ANOTHER HEALTH PROFESSIONAL EXPRESSED CONCERN ABOUT YOUR DRINKING OR SUGGESTED YOU CUT DOWN: NO
HOW OFTEN DO YOU HAVE 6 OR MORE DRINKS ON ONE OCCASION: NEVER
HOW OFTEN DURING THE LAST YEAR HAVE YOU HAD A FEELING OF GUILT OR REMORSE AFTER DRINKING: NEVER
AUDIT-C TOTAL SCORE: 3
AUDIT-C TOTAL SCORE: 3
PRESCIPTION_ABUSE_PAST_12_MONTHS: NO
HOW OFTEN DURING THE LAST YEAR HAVE YOU BEEN UNABLE TO REMEMBER WHAT HAPPENED THE NIGHT BEFORE BECAUSE YOU HAD BEEN DRINKING: NEVER
HOW OFTEN DURING THE LAST YEAR HAVE YOU NEEDED AN ALCOHOLIC DRINK FIRST THING IN THE MORNING TO GET YOURSELF GOING AFTER A NIGHT OF HEAVY DRINKING: NEVER
SKIP TO QUESTIONS 9-10: 1
HAVE YOU OR SOMEONE ELSE BEEN INJURED AS A RESULT OF YOUR DRINKING: NO
AUDIT TOTAL SCORE: 0
SUBSTANCE_ABUSE_PAST_12_MONTHS: NO
AUDIT TOTAL SCORE: 3
HOW MANY STANDARD DRINKS CONTAINING ALCOHOL DO YOU HAVE ON A TYPICAL DAY: 1 OR 2
HOW OFTEN DO YOU HAVE A DRINK CONTAINING ALCOHOL: 2-3 TIMES A WEEK
HOW OFTEN DURING THE LAST YEAR HAVE YOU FOUND THAT YOU WERE NOT ABLE TO STOP DRINKING ONCE YOU HAD STARTED: NEVER
HOW OFTEN DURING THE LAST YEAR HAVE YOU FAILED TO DO WHAT WAS NORMALLY EXPECTED FROM YOU BECAUSE OF DRINKING: NEVER

## 2023-11-16 ASSESSMENT — COGNITIVE AND FUNCTIONAL STATUS - GENERAL
DAILY ACTIVITIY SCORE: 24
MOBILITY SCORE: 24
MOBILITY SCORE: 24
PATIENT BASELINE BEDBOUND: NO
DAILY ACTIVITIY SCORE: 24

## 2023-11-16 ASSESSMENT — PAIN SCALES - GENERAL
PAINLEVEL_OUTOF10: 6
PAINLEVEL_OUTOF10: 6
PAINLEVEL_OUTOF10: 7
PAINLEVEL_OUTOF10: 8
PAINLEVEL_OUTOF10: 7
PAINLEVEL_OUTOF10: 7
PAINLEVEL_OUTOF10: 0 - NO PAIN
PAINLEVEL_OUTOF10: 7

## 2023-11-16 ASSESSMENT — ACTIVITIES OF DAILY LIVING (ADL)
GROOMING: INDEPENDENT
BATHING: INDEPENDENT
WALKS IN HOME: INDEPENDENT
PATIENT'S MEMORY ADEQUATE TO SAFELY COMPLETE DAILY ACTIVITIES?: YES
JUDGMENT_ADEQUATE_SAFELY_COMPLETE_DAILY_ACTIVITIES: YES
ADEQUATE_TO_COMPLETE_ADL: YES
FEEDING YOURSELF: INDEPENDENT
HEARING - LEFT EAR: FUNCTIONAL
DRESSING YOURSELF: INDEPENDENT
LACK_OF_TRANSPORTATION: NO
TOILETING: INDEPENDENT
HEARING - RIGHT EAR: FUNCTIONAL

## 2023-11-16 ASSESSMENT — PATIENT HEALTH QUESTIONNAIRE - PHQ9
SUM OF ALL RESPONSES TO PHQ9 QUESTIONS 1 & 2: 0
1. LITTLE INTEREST OR PLEASURE IN DOING THINGS: NOT AT ALL
2. FEELING DOWN, DEPRESSED OR HOPELESS: NOT AT ALL

## 2023-11-16 ASSESSMENT — PAIN - FUNCTIONAL ASSESSMENT
PAIN_FUNCTIONAL_ASSESSMENT: 0-10

## 2023-11-16 ASSESSMENT — PAIN DESCRIPTION - ORIENTATION: ORIENTATION: RIGHT

## 2023-11-16 ASSESSMENT — PAIN DESCRIPTION - DESCRIPTORS: DESCRIPTORS: SORE

## 2023-11-16 ASSESSMENT — PAIN DESCRIPTION - LOCATION: LOCATION: MOUTH

## 2023-11-16 ASSESSMENT — COLUMBIA-SUICIDE SEVERITY RATING SCALE - C-SSRS
6. HAVE YOU EVER DONE ANYTHING, STARTED TO DO ANYTHING, OR PREPARED TO DO ANYTHING TO END YOUR LIFE?: NO
2. HAVE YOU ACTUALLY HAD ANY THOUGHTS OF KILLING YOURSELF?: NO
1. IN THE PAST MONTH, HAVE YOU WISHED YOU WERE DEAD OR WISHED YOU COULD GO TO SLEEP AND NOT WAKE UP?: NO

## 2023-11-16 NOTE — ANESTHESIA PROCEDURE NOTES
Peripheral IV  Date/Time: 11/16/2023 1:16 PM  Other anesthesia staff: CLOVER Pisano.    Placement  Needle size: 20 G  Laterality: left  Location: forearm (Posterior)  Local anesthetic: none  Site prep: chlorhexidine  Technique: anatomical landmarks  Attempts: 1

## 2023-11-16 NOTE — OP NOTE
Debridement of mandible and placement of custom reconstruction plate, extraction of indicated teeth, possible nerve graft (R) Operative Note     Date: 2023  OR Location: [facility] OR    Name: Steven Olivia Jr., : 1970, Age: 52 y.o., MRN: [id number], Sex: male    Diagnosis  Pre-op Diagnosis     * Osteomyelitis, jaw chronic [M27.2] Post-op Diagnosis     * Osteomyelitis, jaw chronic [M27.2]     Procedures  Debridement of mandible and placement of custom reconstruction plate, extraction of indicated teeth, possible nerve graft  92186 - VT RCNSTJ MNDBL XTRORAL W/TRANSOSTEAL BONE PLATE    VT DEBRIDEMENT BONE MUSCLE &/FASCIA 20 SQ CM/< [67249]  Surgeons      * Cornell Marinelli - Primary    Resident/Fellow/Other Assistant:  Surgeon(s) and Role:     * Jennyfer Hernandez DDS - Resident - Assisting     * Mariel Verde DDS - Resident - Assisting    Procedure Summary  Anesthesia: * No anesthesia type entered *  ASA: III  Anesthesia Staff: Anesthesiologist: Pipo Barros MD; Maureen Castanon MD  C-AA: ALEX Duval; ALEX Calvillo  Estimated Blood Loss: 15 mL  Intra-op Medications:   Medication Name Total Dose   sodium chloride 0.9 % irrigation solution 1,000 mL   bacitracin ointment 1 Application   sodium chloride 0.9% infusion 132.5 mL              Anesthesia Record               Intraprocedure I/O Totals          Intake    Ketamine 0.00 mL    The total shown is the total volume documented since Anesthesia Start was filed.    Propofol Drip 0.00 mL    The total shown is the total volume documented since Anesthesia Start was filed.    Total Intake 0 mL          Specimen:   ID Type Source Tests Collected by Time   1 : RIGHT LEVEL 1 LYMPH NODE Tissue LYMPH NODE BIOPSY SURGICAL PATHOLOGY EXAM Cornell Marinelli DDS 2023 1346   2 : NECROTIC BONE MANDIBLE Tissue MANDIBLE RIGHT BIOPSY SURGICAL PATHOLOGY EXAM Cornell Marinelli DDS 2023 1406   A : NECROTIC BONE MANDIBLE Tissue MANDIBLE RIGHT BIOPSY  "TISSUE/WOUND CULTURE/SMEAR Cornell Marinelli, DDS 11/16/2023 1408   B : NECROTIC BONE MANDIBLE #2 Tissue MANDIBLE RIGHT BIOPSY TISSUE/WOUND CULTURE/SMEAR Cornell Marinelli, DDS 11/16/2023 1409        Staff:   Circulator: Vijaya Lambert RN  Relief Circulator: Dede Baker RN; Chen Greenwood RN  Relief Scrub: Pradeep Mack  Scrub Person: Lisa Molina         Drains and/or Catheters:   Open Drain Right Neck (Active)       Tourniquet Times:         Implants:     Findings: Right mandibular necrotic bone buccal and lingual soft tissue fistulas noted. Involvement of teeth #28- 31.     Indications: Steven Olivia Jr. is an 52 y.o. male who is having surgery for Osteomyelitis of right mandible involving teeth #28-31.     The patient was greeted in the pre-op holding area, where all preoperative risks and complications were reviewed. Later, the patient was brought to the operating room by the anesthesia staff and was placed in the supine position. A \"time out\" was performed to confirmed patient's identity and the procedure to be performed. The anesthesia team then induced general anesthesia and intubated via a nasoendotracheal tube which was secured by the surgical team. The patient was prepped and draped in standard oral maxillofacial surgery fashion.    The patient's head was turned to the left and attention was turned to the right side of the neck. A “time out” was performed. Incision marked with a surgical marking pen extending approximately 8 cm parallel to the inferior border of the mandible, marked at 2 fingerbreadths below the mandible . An incision was made using a 15 blade extending from the skin to the subcutaneous fat layer. The platysma was incised and a subplatysmal flap was elevated using a combination of blunt and sharp dissection. A checkpoint nerve stimulator was used to monitor marginal mandibular nerve after confirming with anesthesia that patient was not paralyzed. Marginal mandibular nerve was not visualized, however " there was no indication that it was violated during testing with the nerve stimulator. The submandibular gland was identified and the facial artery and vein were ligated with 2-0 silk sutures and reflected superiorly.  Dissection proceeded to the inferior border of the mandible. Once the pterygoid masseteric sling of the mandible was visualized and the bony mandible was palpable, the pterygoid masseteric sling was incised using electrocautery. Subperiosteal dissection was carried out to expose the mandible in the region of the necrotic bone as referenced on CT imaging. Mandible was inspected and the custom KLS reconstruction plate was placed and noted to be seating stable. Appropriate bicortical reconstruction screw holes were drilled with surgical drill and copious irrigation, and then the screw holes were populated with bicortical screws. The plate was fixated to the mandible.     A [employer] surgical drill with pineapple bur along with sonopet were used to debride all necrotic bone down to pinpoint bleeding bone.  Bone was sent for permanent pathology and cultures.  Inferior alveolar nerve was visualized and preserved.     Intraorally, a sulcular incision was made with mesial release at tooth #27. Periosteal elevator used to elevate a full thickness flap. Teeth # 28,29,20,31 were extracted without complication and alveolus was reduced inferiorly. Curettage of extraction socket performed. Copious irrigation performed and closure proceeded with 4-0 vicryl in horizontal mattress fashion and running continuous fashion for water right closure.    The neck incision was then copiously irrigated with sterile saline and antibiotic solution.  Floseal placed into right neck. Wound was hemostatic. Closure of wound performed in a layered fashion using 3-0 vicryl sutures to close periosteum and pterygoid masseteric sling. 4-0 vicryl used to close the fascial layers, platysma, and then dermis. 10 flap CHANO drain placed into right  neck in subplatysmal plane and secure with 2-0 silk to skin. Skin was closed with 5-0 resorbable sutures. Bacitracin and island dressing was placed over incision site. The patient was emerged from anesthesia, extubated and was taken to PACU in stable condition.    Dr. Marinelli was present for all critical portions of the case.      Procedure Details: Debridement of right mandibular necrotic bone. Extraction of indicated teeth. Placement of hardware and drain.   Complications:  None; patient tolerated the procedure well.    Disposition: PACU - hemodynamically stable.  Condition: stable         Additional Details:     Attending Attestation: I was present and scrubbed for the entire procedure.    Cornell Marinelli  Phone Number: [phone]

## 2023-11-16 NOTE — ANESTHESIA POSTPROCEDURE EVALUATION
Patient: Steven Olivia Jr.    Procedure Summary       Date: 11/16/23 Room / Location: [facility] OR 22 / [facility] [place]    Anesthesia Start: 1258 Anesthesia Stop: 1537    Procedure: Debridement of mandible and placement of custom reconstruction plate, extraction of indicated teeth, possible nerve graft (Right) Diagnosis:       Osteomyelitis, jaw chronic      (Osteomyelitis)    Surgeons: Cornell Marinelli DDS Responsible Provider: ALEX Duval    Anesthesia Type: general ASA Status: 3            Anesthesia Type: general    Vitals Value Taken Time   /89 11/16/23 1600   Temp 36 °C (96.8 °F) 11/16/23 1530   Pulse 75 11/16/23 1607   Resp 14 11/16/23 1607   SpO2 94 % 11/16/23 1607   Vitals shown include unvalidated device data.    Anesthesia Post Evaluation    Patient location during evaluation: PACU  Patient participation: complete - patient participated  Level of consciousness: awake  Pain management: adequate  Airway patency: patent  Cardiovascular status: acceptable  Respiratory status: acceptable  Hydration status: acceptable  Postoperative Nausea and Vomiting: none  Comments: PONV status: No active issues.        No notable events documented.

## 2023-11-16 NOTE — H&P
History Of Present Illness  Steven Olivia Jr. is a 52 y.o. male presenting with right mandibular osteomyelitis     Past Medical History  Past Medical History:   Diagnosis Date    Alcoholism (CMS/[facility])     Cardiomyopathy (CMS/[facility]) 04/07/2020    Carotid stenosis 04/08/2020    CHF (congestive heart failure) (CMS/[facility])     Coronary artery disease 04/09/2020    s/p CABGx3, Last ECG: Sinus Rhythm WNL on 09/04/2023 by Dr. Lei Pathak    HL (hearing loss)     Hyperlipidemia     Hypertension     ICAO (internal carotid artery occlusion), right     right ICA occlusion, 50-59% stenosis of left ICA    Myocardial infarction (CMS/[facility])     2020    Vision loss     glasses     Medications  - atorvastatin  - aspirin  - ezetimibe  - lisinopril  - metoprolol  - pantoprazole    Surgical History  Past Surgical History:   Procedure Laterality Date    CARDIAC CATHETERIZATION      04/2022 and 04/2020    COLECTOMY      COLONOSCOPY      CORONARY ARTERY BYPASS GRAFT  04/09/2020    CABGx3 with Dr. Sanchez        Social History  He reports that he has been smoking cigarettes. He has never used smokeless tobacco. He reports current alcohol use. He reports that he does not use drugs.    Family History  Family History   Problem Relation Name Age of Onset    Heart disease Mother      Heart disease Father          Allergies  Patient has no known allergies.    Review of Systems   Constitutional: Negative.    HENT: Negative.     Eyes: Negative.    Respiratory:  Positive for cough.    Cardiovascular: Negative.         Physical Exam  Constitutional:       Appearance: Normal appearance.   HENT:      Right Ear: External ear normal.      Left Ear: External ear normal.      Nose: Nose normal.      Mouth/Throat:      Mouth: Mucous membranes are moist.      Comments: Mild right sided CNV3 hypoesthesia, CNVII intact b/l. R buccal expansion adjacent to teeth #28-31. No mobile dentition, poor oral hygiene, no purulence or drainage noted.   Eyes:       Conjunctiva/sclera: Conjunctivae normal.   Pulmonary:      Effort: Pulmonary effort is normal.   Musculoskeletal:      Cervical back: Normal range of motion.   Neurological:      Mental Status: He is alert.         Assessment/Plan   Active Problems:  There are no active Hospital Problems.    Assessment: RL is a 51 yo M who presents with right mandibular osteomyelitis    Plan: Debridement of right mandible, extraction indicated teeth, placement of reconstruction plate, possible nerve graft    Lisseth Arnett DDS  OMFS PGY-1  Team Pager: [phone]  Available on Haiku

## 2023-11-16 NOTE — ANESTHESIA PREPROCEDURE EVALUATION
Patient: Steven Olivia Jr.    Procedure Information       Anesthesia Start Date/Time: 11/16/23 1258    Procedure: Debridement of mandible and placement of custom reconstruction plate, extraction of indicated teeth, possible nerve graft (Right) - This is a 120 minute case    Location: [facility] OR  / [facility]    Surgeons: Cornell Marinelli DDS            Relevant Problems   Cardiovascular   (+) CAD (coronary artery disease)   (+) Primary hypertension   (+) Stenosis of carotid artery      Neuro/Psych   (+) Stenosis of carotid artery       Clinical information reviewed:   Tobacco  Allergies  Meds   Med Hx  Surg Hx   Fam Hx  Soc Hx        NPO Detail:  NPO/Void Status  Date of Last Liquid: 11/16/23  Time of Last Liquid: 0745  Date of Last Solid: 11/16/23  Time of Last Solid: 0000         Physical Exam    Airway  Mallampati: II  TM distance: >3 FB  Neck ROM: full     Cardiovascular   Rhythm: regular  Rate: normal     Dental    Pulmonary   Breath sounds clear to auscultation     Abdominal   Abdomen: soft             Anesthesia Plan    ASA 3     general     The patient is a current smoker.  Patient was previously instructed to abstain from smoking on day of procedure.  Patient smoked on day of procedure.    Postoperative administration of opioids is intended.  Anesthetic plan and risks discussed with patient.  Use of blood products discussed with patient who.    Plan discussed with CAA and attending.

## 2023-11-16 NOTE — ANESTHESIA PROCEDURE NOTES
Airway  Date/Time: 11/16/2023 1:23 PM  Urgency: elective    Airway not difficult    Staffing  Performed: ALEX and CLOVER   Authorized by: ALEX Duval    Performed by: ALEX Duval  Patient location during procedure: OR    Indications and Patient Condition  Indications for airway management: anesthesia and airway protection  Spontaneous Ventilation: absent  Sedation level: deep  Preoxygenated: yes  Patient position: sniffing  Mask difficulty assessment: 1 - vent by mask  Planned trial extubation    Final Airway Details  Final airway type: endotracheal airway      Successful airway: JUAN tube (7.5 JUAN NTT)  Cuffed: yes   Successful intubation technique: video laryngoscopy  Intubation device: Magill Forceps not required. Very easy intubation.  Endotracheal tube insertion site: right naris  Blade: Chayo  Blade size: #4  Cormack-Lehane Classification: grade I - full view of glottis  Placement verified by: chest auscultation and bronchoscopy   Measured from: nares  ETT to nares (cm): 28  Number of attempts at approach: 1 (Per CLOVER Pisano)

## 2023-11-17 VITALS
DIASTOLIC BLOOD PRESSURE: 71 MMHG | RESPIRATION RATE: 18 BRPM | OXYGEN SATURATION: 95 % | HEIGHT: 71 IN | HEART RATE: 71 BPM | WEIGHT: 179.68 LBS | BODY MASS INDEX: 25.15 KG/M2 | TEMPERATURE: 97.5 F | SYSTOLIC BLOOD PRESSURE: 110 MMHG

## 2023-11-17 PROBLEM — M27.2 OSTEOMYELITIS, JAW CHRONIC: Status: RESOLVED | Noted: 2023-11-16 | Resolved: 2023-11-17

## 2023-11-17 PROBLEM — K08.409 S/P TOOTH EXTRACTION: Status: ACTIVE | Noted: 2023-11-17

## 2023-11-17 LAB
ANION GAP SERPL CALC-SCNC: 9 MMOL/L (ref 10–20)
BUN SERPL-MCNC: 10 MG/DL (ref 6–23)
CALCIUM SERPL-MCNC: 9.3 MG/DL (ref 8.6–10.6)
CHLORIDE SERPL-SCNC: 104 MMOL/L (ref 98–107)
CO2 SERPL-SCNC: 26 MMOL/L (ref 21–32)
CREAT SERPL-MCNC: 0.64 MG/DL (ref 0.5–1.3)
ERYTHROCYTE [DISTWIDTH] IN BLOOD BY AUTOMATED COUNT: 14.3 % (ref 11.5–14.5)
GFR SERPL CREATININE-BSD FRML MDRD: >90 ML/MIN/1.73M*2
GLUCOSE SERPL-MCNC: 171 MG/DL (ref 74–99)
HCT VFR BLD AUTO: 36.4 % (ref 41–52)
HGB BLD-MCNC: 12 G/DL (ref 13.5–17.5)
MCH RBC QN AUTO: 32.4 PG (ref 26–34)
MCHC RBC AUTO-ENTMCNC: 33 G/DL (ref 32–36)
MCV RBC AUTO: 98 FL (ref 80–100)
NRBC BLD-RTO: 0 /100 WBCS (ref 0–0)
PLATELET # BLD AUTO: 213 X10*3/UL (ref 150–450)
POTASSIUM SERPL-SCNC: 4.3 MMOL/L (ref 3.5–5.3)
RBC # BLD AUTO: 3.7 X10*6/UL (ref 4.5–5.9)
SODIUM SERPL-SCNC: 135 MMOL/L (ref 136–145)
WBC # BLD AUTO: 10.2 X10*3/UL (ref 4.4–11.3)

## 2023-11-17 PROCEDURE — 2500000004 HC RX 250 GENERAL PHARMACY W/ HCPCS (ALT 636 FOR OP/ED): Performed by: STUDENT IN AN ORGANIZED HEALTH CARE EDUCATION/TRAINING PROGRAM

## 2023-11-17 PROCEDURE — 2500000001 HC RX 250 WO HCPCS SELF ADMINISTERED DRUGS (ALT 637 FOR MEDICARE OP): Performed by: STUDENT IN AN ORGANIZED HEALTH CARE EDUCATION/TRAINING PROGRAM

## 2023-11-17 PROCEDURE — 80048 BASIC METABOLIC PNL TOTAL CA: CPT | Performed by: STUDENT IN AN ORGANIZED HEALTH CARE EDUCATION/TRAINING PROGRAM

## 2023-11-17 PROCEDURE — 36415 COLL VENOUS BLD VENIPUNCTURE: CPT | Performed by: STUDENT IN AN ORGANIZED HEALTH CARE EDUCATION/TRAINING PROGRAM

## 2023-11-17 PROCEDURE — 7100000011 HC EXTENDED STAY RECOVERY HOURLY - NURSING UNIT

## 2023-11-17 PROCEDURE — 96372 THER/PROPH/DIAG INJ SC/IM: CPT | Performed by: STUDENT IN AN ORGANIZED HEALTH CARE EDUCATION/TRAINING PROGRAM

## 2023-11-17 PROCEDURE — 85027 COMPLETE CBC AUTOMATED: CPT | Performed by: STUDENT IN AN ORGANIZED HEALTH CARE EDUCATION/TRAINING PROGRAM

## 2023-11-17 RX ORDER — BACITRACIN ZINC 500 UNIT/G
OINTMENT (GRAM) TOPICAL 2 TIMES DAILY
Qty: 28 G | Refills: 0 | Status: SHIPPED | OUTPATIENT
Start: 2023-11-17 | End: 2023-11-24

## 2023-11-17 RX ORDER — AMOXICILLIN AND CLAVULANATE POTASSIUM 875; 125 MG/1; MG/1
1 TABLET, FILM COATED ORAL 2 TIMES DAILY
Qty: 14 TABLET | Refills: 0 | Status: SHIPPED | OUTPATIENT
Start: 2023-11-17 | End: 2023-11-24

## 2023-11-17 RX ORDER — ACETAMINOPHEN 500 MG
500 TABLET ORAL EVERY 6 HOURS PRN
Qty: 20 TABLET | Refills: 0 | Status: SHIPPED | OUTPATIENT
Start: 2023-11-17 | End: 2023-11-22

## 2023-11-17 RX ORDER — OXYCODONE HYDROCHLORIDE 5 MG/1
5 CAPSULE ORAL EVERY 6 HOURS PRN
Qty: 20 CAPSULE | Refills: 0 | Status: SHIPPED | OUTPATIENT
Start: 2023-11-17 | End: 2023-11-22

## 2023-11-17 RX ORDER — CHLORHEXIDINE GLUCONATE ORAL RINSE 1.2 MG/ML
15 SOLUTION DENTAL 2 TIMES DAILY
Qty: 210 ML | Refills: 0 | Status: SHIPPED | OUTPATIENT
Start: 2023-11-17 | End: 2023-11-24

## 2023-11-17 RX ORDER — IBUPROFEN 600 MG/1
600 TABLET ORAL EVERY 6 HOURS PRN
Qty: 20 TABLET | Refills: 0 | Status: SHIPPED | OUTPATIENT
Start: 2023-11-17 | End: 2023-11-22

## 2023-11-17 RX ADMIN — OXYCODONE HYDROCHLORIDE 10 MG: 5 TABLET ORAL at 01:50

## 2023-11-17 RX ADMIN — SODIUM CHLORIDE, POTASSIUM CHLORIDE, SODIUM LACTATE AND CALCIUM CHLORIDE 125 ML/HR: 600; 310; 30; 20 INJECTION, SOLUTION INTRAVENOUS at 06:21

## 2023-11-17 RX ADMIN — ACETAMINOPHEN 650 MG: 650 SOLUTION ORAL at 00:20

## 2023-11-17 RX ADMIN — AMPICILLIN AND SULBACTAM 3 G: 2; 1 INJECTION, POWDER, FOR SOLUTION INTRAVENOUS at 03:47

## 2023-11-17 RX ADMIN — METOPROLOL SUCCINATE 200 MG: 25 TABLET, EXTENDED RELEASE ORAL at 08:36

## 2023-11-17 RX ADMIN — CHLORHEXIDINE GLUCONATE 15 ML: 1.2 SOLUTION ORAL at 08:36

## 2023-11-17 RX ADMIN — OXYCODONE HYDROCHLORIDE 10 MG: 5 TABLET ORAL at 06:18

## 2023-11-17 RX ADMIN — ASPIRIN 81 MG: 81 TABLET, COATED ORAL at 08:36

## 2023-11-17 RX ADMIN — DEXAMETHASONE SODIUM PHOSPHATE 8 MG: 10 INJECTION INTRAMUSCULAR; INTRAVENOUS at 06:15

## 2023-11-17 RX ADMIN — PANTOPRAZOLE SODIUM 40 MG: 40 TABLET, DELAYED RELEASE ORAL at 06:16

## 2023-11-17 RX ADMIN — ENOXAPARIN SODIUM 40 MG: 100 INJECTION SUBCUTANEOUS at 08:36

## 2023-11-17 RX ADMIN — ACETAMINOPHEN 650 MG: 650 SOLUTION ORAL at 06:16

## 2023-11-17 ASSESSMENT — COGNITIVE AND FUNCTIONAL STATUS - GENERAL: MOBILITY SCORE: 24

## 2023-11-17 ASSESSMENT — PAIN SCALES - GENERAL
PAINLEVEL_OUTOF10: 7
PAINLEVEL_OUTOF10: 7

## 2023-11-17 ASSESSMENT — PAIN - FUNCTIONAL ASSESSMENT: PAIN_FUNCTIONAL_ASSESSMENT: 0-10

## 2023-11-17 NOTE — POST-PROCEDURE NOTE
"RL is a 53 yo male who is POD 0 for R mandibular debridement, extraction teeth #28-31, placement of reconstruction plate.    S: Patient in overall good spirits but reports 7/10 pain and that \"I'm really hungry.\"    O: Mild R neck and perioral edema consistent with surgery. Intraoral and extraoral incisions hemostatic with sutures intact. Neck drain expressing minimal serosanguinous fluid, dressing and jawbra intact. Mild R CNV3 hypoesthesia and R CNVII weakness consistent with surgery.    A: Patient recovering well from surgery. Encouraged him to follow his full liquid diet tonight, ambulate, and use the bathroom.    P: Plan for discharge 11/17 if no acute events overnight    Lisseth Arnett DDS  [facility] [place] PGY-1  Team Pager: [phone]  Available on Haiku    "

## 2023-11-17 NOTE — CARE PLAN
Problem: Pain  Goal: Takes deep breaths with improved pain control throughout the shift  11/17/2023 0746 by Shasha Serrano RN  Outcome: Progressing  11/17/2023 0244 by Shasha Serrano RN  Outcome: Progressing  Goal: Turns in bed with improved pain control throughout the shift  11/17/2023 0746 by Shasha Serrano RN  Outcome: Progressing  11/17/2023 0244 by Shasha Serrano RN  Outcome: Progressing  Goal: Walks with improved pain control throughout the shift  11/17/2023 0746 by Shasha Serrano RN  Outcome: Progressing  11/17/2023 0244 by Shasha Serrano RN  Outcome: Progressing  Goal: Performs ADL's with improved pain control throughout shift  11/17/2023 0746 by Shasha Serrano RN  Outcome: Progressing  11/17/2023 0244 by Shasha Serrano RN  Outcome: Progressing  Goal: Participates in PT with improved pain control throughout the shift  11/17/2023 0746 by Shasha Serrano RN  Outcome: Progressing  11/17/2023 0244 by Shasha Serrano RN  Outcome: Progressing  Goal: Free from opioid side effects throughout the shift  11/17/2023 0746 by Shasha Serrano RN  Outcome: Progressing  11/17/2023 0244 by Shasha Serrano RN  Outcome: Progressing  Goal: Free from acute confusion related to pain meds throughout the shift  11/17/2023 0746 by Shasha Serrano RN  Outcome: Progressing  11/17/2023 0244 by Shasha Serrano RN  Outcome: Progressing   The patient's goals for the shift include      The clinical goals for the shift include pain control    Pt c/o jaw pain and rates pain at 7 on scale of 0-10. Pt medicated with Oxycodone with stated relief.

## 2023-11-17 NOTE — DISCHARGE SUMMARY
Department of Oral & Maxillofacial Surgery  [address]st [address] ([facility])  [place] [id number]    Office phone number: [phone].  Office fax number: [phone].  Team Pager: [phone].  Patients can contact the [id number] through the hosptial  [phone].        Discharge Diagnosis  Osteomyelitis, jaw chronic      Test Results Pending At Discharge  Pending Labs       Order Current Status    Surgical Pathology Exam In process    Tissue/Wound Culture/Smear In process    Tissue/Wound Culture/Smear In process              Pertinent Physical Exam At Time of Discharge  Physical Exam- R mandibular swelling consistent with surgery, mild R CNV3 hypoesthesia and R CNVII weakness    Home Medications     Medication List      START taking these medications     acetaminophen 500 mg tablet; Commonly known as: Tylenol; Take 1 tablet   (500 mg) by mouth every 6 hours if needed for mild pain (1 - 3) for up to   5 days.   amoxicillin-pot clavulanate 875-125 mg tablet; Commonly known as:   Augmentin; Take 1 tablet (875 mg) by mouth 2 times a day for 7 days.   bacitracin 500 unit/gram ointment; Apply topically 2 times a day for 7   days.   chlorhexidine 0.12 % solution; Commonly known as: Peridex; Use 15 mL in   the mouth or throat 2 times a day for 7 days.   ibuprofen 600 mg tablet; Take 1 tablet (600 mg) by mouth every 6 hours   if needed for mild pain (1 - 3) for up to 5 days.   oxyCODONE 5 mg immediate release capsule; Commonly known as: Oxy-IR;   Take 1 capsule (5 mg) by mouth every 6 hours if needed for severe pain (7   - 10) for up to 5 days.     CONTINUE taking these medications     aspirin 81 mg EC tablet   atorvastatin 80 mg tablet; Commonly known as: Lipitor   ezetimibe 10 mg tablet; Commonly known as: Zetia   lisinopriL-hydrochlorothiazide 20-12.5 mg tablet   metoprolol succinate  mg 24 hr tablet; Commonly known as:   Toprol-XL   pantoprazole  40 mg EC tablet; Commonly known as: ProtoNix           Lisseth Arnett DDS

## 2023-11-17 NOTE — DISCHARGE INSTRUCTIONS
"MEDICATIONS  ° Pain medication should be taken only during the time that you feel significant discomfort. If the pain is severe, the prescription medication can be used. However, if only mild discomfort is experienced, try to use a less potent, over the counter medication such as Advil (ibuprofen and/or Tylenol (Acetaminophen). These can be taken in crushed tablets or in liquid form.  ° Antibiotics: This medicine should be taken at the appropriate interval as described on the bottle. Be sure not to miss any doses and take the medicine until it is gone.  ° Lip ointment:  Keep enough ointment, such as Vaseline, on the lips to keep them looking wet.    WOUND CARE  ° We recommend using ice packs on your face for the first 48 hours to reduce swelling and bruising over the affected areas. We recommend 20 minutes on and 20 minutes off. Make sure to wrap the ice pack in a towel - do not apply directly to skin.  Cold or heat directly on numb areas can lead to permanent damage of the skin.  ° Keep your head elevated, at least 30 degrees, to minimize swelling. This may require you to sleep in a reclining chair or using several pillows in bed.  ° Do not shower if you have a dressing on. Once the dressing is removed, keep the wound clean and dry for five days. On the 6th day, you may get it wet but no direct streams of water (for example, in the shower) and be careful to pat dry and not rub the wound.   ° If you have sutures, you may clean the sutures only with a cotton swab and a solution of ½ hydrogen peroxide and ½ water. We highly recommend this as the sutures dry out and become \"crusty\" with time.  ° Keep a thin coating of antibiotic ointment or Vaseline on the surgical sites (Polysporin, Bacitracin).     PHYSICAL ACTIVITY  ° Following surgery you will find that your energy level is much lower. This will take some time to return to normal.  ° Although you just had surgery, it is important that you do not stay in bed while " awake to minimize the chances of leg clots. We highly encourage occasional standing and walking as tolerated.  ° Try to transfer to a chair instead and walk throughout the house as much as you can tolerate. Try to be as active as possible. The swelling will worsen over the next 3-4 days after surgery and is expected. The swelling will begin to subside over the next few days.  ° Physical exercise such as walking or running can begin 2 or 3 weeks after surgery.  ° When you attempt to return to normal physical activity start slowly and work up to your normal level.  ° It is important that you take deep breaths after surgery to help prevent development of pneumonia. Try to take 10-20 deep breaths every hour while you are awake. Encourage yourself to gently cough if you feel mucous accumulating in the back of your throat or lungs.     DIET  ° Maintain a no chew diet until follow up.  ° Your diet will be slowly advanced accordingly during follow up.   ° Some sort of diet supplement such as Boost, Ensure, or similar substitutes may be used to increase calorie intake.    DRINKING  Keep hydrated by drinking at least 8-9 glasses of liquids a day. This is extremely important to prevent dehydration. Signs of dehydration are: dry mouth, dark yellow urine in small amounts, and dizziness with change in position or increased feeling of weakness/tiredness.  ° Do not use a straw for the first day or two since this can create more bleeding and may be difficult due to numbness.  ° Attempt to drink from a cup as soon as possible. While some fluid may spill when drinking, a cup is still the most effective way for taking fluids. The use of a ``sippy cup´´ like those used by toddlers may be helpful for the first few days.  ° Place a towel under your chin and pour a small amount of liquid into the cup. Tip your head back slightly and attempt to open your mouth a tiny bit while pouring the fluid into your mouth. Pour slowly and take in a  small amount of fluid. Take the cup away from your mouth. If necessary use slight finger pressure to place your lips together and attempt to swallow. This will be difficult at first, but you will find that it will become much easier in a day or two.  ° If drinking from a cup seems to be impossible, continue using the syringe with the tubing attached. Another alternative is to obtain a squeeze bottle to squirt fluid into your mouth.    BLEEDING  ° Change gauze as directed every 20-30 minutes until active bleeding has subsided (usually 2-3 hours).  ° Make sure to apply good pressure to the gauze.  ° You may remove gauze to begin drinking, but return fresh gauze to surgery sites if bleeding is still present.  ° It is normal to experience light bleeding or oozing for up to 24 hours. If there is severe bleeding follow instructions at the bottom of the form under ``Excessive Bleeding´´    HYGIENE  ° Do not brush your teeth, rinse your mouth or spit for the first 24 hours  ° May brush your teeth as normal the next day  ° If prescribed Chlorhexidine rinse, gently rinse and spit the medication three times per day. Do not swallow the medication.      FIRST DAY AFTER SURGERY  ° Hygiene: Return to your normal brushing routine, being very careful around surgery site(s).  ° Avoid using full-strength mouthwashes for 2 weeks.  ° Begin using a chlorhexidine or warm salt-water rinse (1/4 teaspoon salt in a glass of warm water) after meals for the first week.  ° Pain and swelling is normal and expected, and may last for 10-14 days. Don´t be alarmed if the third day is the worst.  ° Continue eating soft foods. You may begin to gradually return to your normal diet as tolerated. Avoid spicy foods and drinks for 2 weeks.    FACIAL MUSCLE WEAKNESS  ° The site of the surgery is adjacent to branches of the nerve that controls the muscles of your face. Due to this, nerves may be stretched due to the surgery or post-operative swelling, causing  weakness of these muscles.   ° This will generally resolve over the course of weeks to months, and will be monitored during your follow up visits.     PLEASE REPORT ANY OF THE FOLLOWING TO OUR TEAM:  ° Sudden or excessive bleeding, swelling, or bruising.  ° Any itching, rash, or adverse reaction to medication.  ° Fever over 100 degrees Fahrenheit.  ° Drainage or discharge from the incision sites (other than blood).  ° Any injury to the face over the next 6 weeks.    If you have any questions or concerns please contact us during regular office hours ([phone]).  For any emergency or after hours questions do not hesitate to contact the resident on call. You may call [phone] for the hospital  and ask for the ``Oral Surgeon On Call´´.

## 2023-11-17 NOTE — DISCHARGE SUMMARY
Discharge Diagnosis  Osteomyelitis, jaw chronic    Test Results Pending At Discharge  Pending Labs       Order Current Status    Tissue/Wound Culture/Smear Collected (11/16/23 7890)    Tissue/Wound Culture/Smear Collected (11/16/23 2424)    Surgical Pathology Exam In process            Hospital Course   - Day 0: R mandibular debridement, placement of reconstruction plate, ext teeth #28-31, admission to [facility] service  - Day 1: No acute events overnight, patient discharged    Pertinent Physical Exam At Time of Discharge  Physical Exam  Constitutional:       Appearance: Normal appearance.   HENT:      Head:      Comments: Mild R neck and perioral edema consistent with surgery. Intraoral and extraoral incisions hemostatic with sutures intact. Neck drain expressing minimal serosanguinous fluid so it was removed before discharged. Jobes dressing with kerelex fluffs in place for 24 hours. Mild R CNV3 hypoesthesia and R CNVII weakness consistent with surgery.     Right Ear: External ear normal.      Left Ear: External ear normal.      Nose: Nose normal.      Mouth/Throat:      Mouth: Mucous membranes are moist.   Pulmonary:      Effort: Pulmonary effort is normal.   Genitourinary:     Comments: Patient able to urinate and pass gas, significant liquid intake overnight  Skin:     General: Skin is warm and dry.   Neurological:      Mental Status: He is alert.   Psychiatric:         Mood and Affect: Mood normal.         Behavior: Behavior normal.         Home Medications     Medication List      ASK your doctor about these medications     aspirin 81 mg EC tablet   atorvastatin 80 mg tablet; Commonly known as: Lipitor   ezetimibe 10 mg tablet; Commonly known as: Zetia   lisinopriL-hydrochlorothiazide 20-12.5 mg tablet   metoprolol succinate  mg 24 hr tablet; Commonly known as:   Toprol-XL   pantoprazole 40 mg EC tablet; Commonly known as: ProtoNix     - Patient to resume all home medications and take prescriptions as  outlined below    Assessment- RL is a 51 yo male 1 day s/p R mandibular debridement, ext teeth 28-31, and placement of reconstruction plate. Patient recovering well and stable for discharge    Plan  - Discharge 11/17  - Full liquid diet until followup, no straws/spitting/smoking  - Discharge medications: Augmentin, tylenol, ibuprofen, chlorhexidine, bacitracin topical, oxycodone 5mg (see med rec for details)  - followup outpatient clinic 11/21/23 @3:30pm  - sleep with HOB elevated      Outpatient Follow-Up  - 11/21/23 @3:30pm    Lisseth Arnett DDS  [facility] [place] PGY-1  Team Pager: [phone]  Available on Haiku

## 2023-11-22 DIAGNOSIS — M27.2 OSTEOMYELITIS OF JAW: Primary | ICD-10-CM

## 2023-11-23 LAB
BACTERIA SPEC CULT: ABNORMAL
BACTERIA SPEC CULT: ABNORMAL
BACTERIA SPEC CULT: NORMAL
BACTERIA SPEC CULT: NORMAL
GRAM STN SPEC: ABNORMAL
GRAM STN SPEC: NORMAL
GRAM STN SPEC: NORMAL

## 2023-11-30 ENCOUNTER — PREP FOR PROCEDURE (OUTPATIENT)
Dept: ORTHOPEDIC SURGERY | Facility: CLINIC | Age: 53
End: 2023-11-30
Payer: MEDICAID

## 2023-12-11 LAB
LABORATORY COMMENT REPORT: NORMAL
PATH REPORT.ADDENDUM SPEC: NORMAL
PATH REPORT.FINAL DX SPEC: NORMAL
PATH REPORT.GROSS SPEC: NORMAL
PATH REPORT.RELEVANT HX SPEC: NORMAL
PATH REPORT.TOTAL CANCER: NORMAL

## 2023-12-11 PROCEDURE — 88312 SPECIAL STAINS GROUP 1: CPT | Performed by: PATHOLOGY

## 2023-12-21 ENCOUNTER — OFFICE VISIT (OUTPATIENT)
Dept: INFECTIOUS DISEASES | Facility: CLINIC | Age: 53
End: 2023-12-21
Payer: MEDICAID

## 2023-12-21 ENCOUNTER — LAB (OUTPATIENT)
Dept: LAB | Facility: LAB | Age: 53
End: 2023-12-21
Payer: MEDICAID

## 2023-12-21 VITALS
BODY MASS INDEX: 26.32 KG/M2 | HEART RATE: 72 BPM | TEMPERATURE: 98.2 F | WEIGHT: 188 LBS | DIASTOLIC BLOOD PRESSURE: 61 MMHG | HEIGHT: 71 IN | SYSTOLIC BLOOD PRESSURE: 98 MMHG

## 2023-12-21 DIAGNOSIS — M27.2 OSTEOMYELITIS OF MANDIBLE: Primary | ICD-10-CM

## 2023-12-21 DIAGNOSIS — M27.2 OSTEOMYELITIS OF MANDIBLE: ICD-10-CM

## 2023-12-21 LAB
ALBUMIN SERPL BCP-MCNC: 4.2 G/DL (ref 3.4–5)
ALP SERPL-CCNC: 55 U/L (ref 33–120)
ALT SERPL W P-5'-P-CCNC: 34 U/L (ref 10–52)
ANION GAP SERPL CALC-SCNC: 14 MMOL/L (ref 10–20)
AST SERPL W P-5'-P-CCNC: 24 U/L (ref 9–39)
BASOPHILS # BLD AUTO: 0.03 X10*3/UL (ref 0–0.1)
BASOPHILS NFR BLD AUTO: 0.5 %
BILIRUB SERPL-MCNC: 0.3 MG/DL (ref 0–1.2)
BUN SERPL-MCNC: 19 MG/DL (ref 6–23)
CALCIUM SERPL-MCNC: 9.5 MG/DL (ref 8.6–10.6)
CHLORIDE SERPL-SCNC: 103 MMOL/L (ref 98–107)
CO2 SERPL-SCNC: 28 MMOL/L (ref 21–32)
CREAT SERPL-MCNC: 0.82 MG/DL (ref 0.5–1.3)
CRP SERPL-MCNC: <0.1 MG/DL
EOSINOPHIL # BLD AUTO: 0.19 X10*3/UL (ref 0–0.7)
EOSINOPHIL NFR BLD AUTO: 3.3 %
ERYTHROCYTE [DISTWIDTH] IN BLOOD BY AUTOMATED COUNT: 13.4 % (ref 11.5–14.5)
ERYTHROCYTE [SEDIMENTATION RATE] IN BLOOD BY WESTERGREN METHOD: 4 MM/H (ref 0–20)
GFR SERPL CREATININE-BSD FRML MDRD: >90 ML/MIN/1.73M*2
GLUCOSE SERPL-MCNC: 108 MG/DL (ref 74–99)
HCT VFR BLD AUTO: 40.3 % (ref 41–52)
HGB BLD-MCNC: 12.8 G/DL (ref 13.5–17.5)
IMM GRANULOCYTES # BLD AUTO: 0.01 X10*3/UL (ref 0–0.7)
IMM GRANULOCYTES NFR BLD AUTO: 0.2 % (ref 0–0.9)
LYMPHOCYTES # BLD AUTO: 2.13 X10*3/UL (ref 1.2–4.8)
LYMPHOCYTES NFR BLD AUTO: 36.5 %
MCH RBC QN AUTO: 30.6 PG (ref 26–34)
MCHC RBC AUTO-ENTMCNC: 31.8 G/DL (ref 32–36)
MCV RBC AUTO: 96 FL (ref 80–100)
MONOCYTES # BLD AUTO: 0.65 X10*3/UL (ref 0.1–1)
MONOCYTES NFR BLD AUTO: 11.1 %
NEUTROPHILS # BLD AUTO: 2.83 X10*3/UL (ref 1.2–7.7)
NEUTROPHILS NFR BLD AUTO: 48.4 %
NRBC BLD-RTO: 0 /100 WBCS (ref 0–0)
PLATELET # BLD AUTO: 182 X10*3/UL (ref 150–450)
POTASSIUM SERPL-SCNC: 4.5 MMOL/L (ref 3.5–5.3)
PROT SERPL-MCNC: 6.6 G/DL (ref 6.4–8.2)
RBC # BLD AUTO: 4.18 X10*6/UL (ref 4.5–5.9)
SODIUM SERPL-SCNC: 140 MMOL/L (ref 136–145)
WBC # BLD AUTO: 5.8 X10*3/UL (ref 4.4–11.3)

## 2023-12-21 PROCEDURE — 99215 OFFICE O/P EST HI 40 MIN: CPT | Performed by: INTERNAL MEDICINE

## 2023-12-21 PROCEDURE — 3078F DIAST BP <80 MM HG: CPT | Performed by: INTERNAL MEDICINE

## 2023-12-21 PROCEDURE — 85025 COMPLETE CBC W/AUTO DIFF WBC: CPT

## 2023-12-21 PROCEDURE — 3074F SYST BP LT 130 MM HG: CPT | Performed by: INTERNAL MEDICINE

## 2023-12-21 PROCEDURE — 36415 COLL VENOUS BLD VENIPUNCTURE: CPT

## 2023-12-21 PROCEDURE — 85652 RBC SED RATE AUTOMATED: CPT

## 2023-12-21 PROCEDURE — 86140 C-REACTIVE PROTEIN: CPT

## 2023-12-21 PROCEDURE — 80053 COMPREHEN METABOLIC PANEL: CPT

## 2023-12-21 RX ORDER — AMOXICILLIN AND CLAVULANATE POTASSIUM 500; 125 MG/1; MG/1
500 TABLET, FILM COATED ORAL 2 TIMES DAILY
Qty: 180 TABLET | Refills: 0 | Status: SHIPPED | OUTPATIENT
Start: 2023-12-21 | End: 2024-03-20

## 2023-12-21 RX ORDER — AMOXICILLIN AND CLAVULANATE POTASSIUM 875; 125 MG/1; MG/1
875 TABLET, FILM COATED ORAL 2 TIMES DAILY
Qty: 84 TABLET | Refills: 0 | Status: SHIPPED | OUTPATIENT
Start: 2023-12-21 | End: 2024-02-01

## 2023-12-21 ASSESSMENT — ENCOUNTER SYMPTOMS
PSYCHIATRIC NEGATIVE: 1
HEMATOLOGIC/LYMPHATIC NEGATIVE: 1
GASTROINTESTINAL NEGATIVE: 1
RESPIRATORY NEGATIVE: 1
CARDIOVASCULAR NEGATIVE: 1
NEUROLOGICAL NEGATIVE: 1
ALLERGIC/IMMUNOLOGIC NEGATIVE: 1
MUSCULOSKELETAL NEGATIVE: 1
CONSTITUTIONAL NEGATIVE: 1
EYES NEGATIVE: 1

## 2023-12-21 ASSESSMENT — PAIN SCALES - GENERAL: PAINLEVEL: 0-NO PAIN

## 2023-12-21 NOTE — LETTER
12/21/23    [place], JEREMY  [address] [facility] [address]      Dear Dr. [place], JEREMY,    Thank you for referring your patient, Steven Olivia Jr., to receive care in my office. I have enclosed a summary of the care provided to Steven on 12/21/23.    Please contact me with any questions you may have regarding the visit.    Sincerely,         Jayant Quintanilla MD MPH  [address]  [facility] [address]  [facility] [address]    CC: No Recipients

## 2023-12-21 NOTE — PROGRESS NOTES
Infectious Diseases Clinic Consult Note:    Referred by Dr Nakita Montesinos MD: No Assigned PCP Generic Provider, MD    Reason for Consult: Osteomyelitis of mandible     Patient is a 53-year-old male with a past medical history significant for hypertension (HTN), coronary artery disease (CAD) status post-coronary artery bypass grafting (CABG), partial colectomy in approximately 2015, perforated appendix, and dental caries over the past 6 months leading to abscess formation requiring an initial root canal, which became complicated. The patient was treated with amoxicillin-clavulanate for the infection and eventually underwent extraction of three lower right teeth. A CT scan revealed the presence of a plate and screws. The patient was then referred here for further management.    On 11/16/2023, the patient underwent debridement of the mandible, placement of a custom reconstruction plate, extraction of indicated teeth, and possibly a nerve graft by Dr. Marinelli. The mandible biopsy report indicated the presence of mixed Gram-positive bacteria.    The patient was then referred here for further management.    The patient has a 15-year history of tobacco use, is a social drinker, denies intravenous drug use, is currently unemployed, and resides with his wife. He has no known drug allergies.      History of Current Issue  Steven Olivia Jr. is a 53 y.o. year old male     PAST MEDICAL HISTORY:  Past Medical History:   Diagnosis Date    Alcoholism (CMS/[facility])     Cardiomyopathy (CMS/Formerly [facility]) 04/07/2020    Carotid stenosis 04/08/2020    CHF (congestive heart failure) (CMS/Formerly [facility])     Coronary artery disease 04/09/2020    s/p CABGx3, Last ECG: Sinus Rhythm WNL on 09/04/2023 by Dr. Lei Pathak    HL (hearing loss)     Hyperlipidemia     Hypertension     ICAO (internal carotid artery occlusion), right     right ICA occlusion, 50-59% stenosis of left ICA    Myocardial infarction (CMS/[facility])     2020    Vision loss     glasses     PAST SURGICAL  HISTORY:  Past Surgical History:   Procedure Laterality Date    CARDIAC CATHETERIZATION      04/2022 and 04/2020    COLECTOMY      COLONOSCOPY      CORONARY ARTERY BYPASS GRAFT  04/09/2020    CABGx3 with Dr. Sanchez     ALLERGIES:    No Known Allergies    MEDICATIONS:      Current Outpatient Medications:     aspirin 81 mg EC tablet, Take 1 tablet (81 mg) by mouth once daily., Disp: , Rfl:     atorvastatin (Lipitor) 80 mg tablet, Take 1 tablet (80 mg) by mouth once daily at bedtime., Disp: , Rfl:     ezetimibe (Zetia) 10 mg tablet, Take 1 tablet (10 mg) by mouth once daily., Disp: , Rfl:     lisinopriL-hydrochlorothiazide 20-12.5 mg tablet, Take 1 tablet by mouth once daily., Disp: , Rfl:     metoprolol succinate XL (Toprol-XL) 200 mg 24 hr tablet, Take 1 tablet (200 mg) by mouth once daily. DO NOT CRUSH OR CHEW, Disp: , Rfl:     pantoprazole (ProtoNix) 40 mg EC tablet, Take 1 tablet (40 mg) by mouth once daily in the morning. Take before meals., Disp: , Rfl:     FAMILY HISTORY:   Family History   Problem Relation Name Age of Onset    Heart disease Mother      Heart disease Father          SOCIAL HISTORY:       Marital Status:  Tobacco / Smokeless tobacco/ Vaping:   reports that he has been smoking cigarettes. He has never used smokeless tobacco.   Alcohol:   Social History     Substance and Sexual Activity   Alcohol Use Yes      Drug Use:    Social History     Substance and Sexual Activity   Drug Use Not on file     IMMUNIZATIONS:    Immunization History   Administered Date(s) Administered    Moderna SARS-CoV-2 Vaccination 04/13/2021, 05/15/2021       REVIEW OF SYSTEMS:  Review of Systems   Constitutional: Negative.    HENT: Negative.     Eyes: Negative.    Respiratory: Negative.     Cardiovascular: Negative.    Gastrointestinal: Negative.    Genitourinary: Negative.    Musculoskeletal: Negative.    Skin: Negative.    Allergic/Immunologic: Negative.    Neurological: Negative.    Hematological: Negative.   "  Psychiatric/Behavioral: Negative.     All other systems reviewed and are negative.      PHYSICAL EXAMINATION:     Visit Vitals  BP 98/61   Pulse 72   Temp 36.8 °C (98.2 °F)   Ht 1.803 m (5' 11\")   Wt 85.3 kg (188 lb)   BMI 26.22 kg/m²   Smoking Status Every Day   BSA 2.07 m²        EXAM:  Physical Exam  Vitals reviewed.   Constitutional:       Appearance: Normal appearance.   HENT:      Head: Normocephalic.      Nose: Nose normal.   Eyes:      Extraocular Movements: Extraocular movements intact.      Pupils: Pupils are equal, round, and reactive to light.   Cardiovascular:      Rate and Rhythm: Normal rate and regular rhythm.   Pulmonary:      Effort: Pulmonary effort is normal.   Abdominal:      General: Abdomen is flat. Bowel sounds are normal.   Musculoskeletal:         General: Normal range of motion.      Cervical back: Normal range of motion.   Skin:     General: Skin is warm.   Neurological:      General: No focal deficit present.      Mental Status: He is alert.   Psychiatric:         Mood and Affect: Mood normal.         Behavior: Behavior normal.         Thought Content: Thought content normal.         Judgment: Judgment normal.          DATA:    Microbiology:   Susceptibility data from last 90 days.  Collected Specimen Info Organism   11/16/23 Tissue from MANDIBLE RIGHT BIOPSY Mixed Anaerobic Bacteria     Mixed Gram-Positive Bacteria    11/16/23 Tissue from MANDIBLE RIGHT BIOPSY Mixed Anaerobic Bacteria     Mixed Gram-Positive Bacteria       Imaging Studies:    CT FACIAL 9/14/2023  IMPRESSION:  1.  Irregular hypodensity/lucencies with erosion of the outer and  inner cortical table are present in the right mandible, contiguous  with the roots of the 1st right mandibular molar, representing  periapical abscesses/osteomyelitis in the appropriate clinical  setting. Small amount of nonspecific air is present in the adjacent  buccal soft tissues, without evidence of buckle fluid collections or  edema.  2. " Several additional subtle periapical lucencies are present in the  right maxillary tooth roots, incompletely characterized on current  study. Correlation with dental exam is recommended.      ASSESSMENT / RECOMMENDATIONS:  A 53-year-old male with a complex medical history, including hypertension, coronary artery disease (CAD) post-CABG, partial colectomy (2015), and a perforated appendix, developed dental caries leading to abscesses. Amoxicillin-clavulanate was administered, and three lower right teeth were eventually extracted after complications. A CT scan revealed a plate and screws. Referred for further management, the patient underwent debridement of the mandible, placement of a custom reconstruction plate, teeth extraction, and possibly a nerve graft by Dr. Marinelli on 11/16/2023. The mandible biopsy reported the presence of mixed Gram-positive bacteria.    IMPRESSION  Mandibular OM, s/p reconstruction  with hardware    PLAN:  Amox-clav 875 mg PO BID x 6 weeks  This will be followed by Amox-clav 500 mg PO BID for chronic suppression  RTC in 3 mo for follow up    I spent 40 minutes in the professional and overall care of this patient.      Jayant Quintanilla MD MPH

## 2024-01-18 ENCOUNTER — APPOINTMENT (OUTPATIENT)
Dept: INFECTIOUS DISEASES | Facility: CLINIC | Age: 54
End: 2024-01-18
Payer: MEDICAID

## 2024-03-21 ENCOUNTER — APPOINTMENT (OUTPATIENT)
Dept: INFECTIOUS DISEASES | Facility: CLINIC | Age: 54
End: 2024-03-21
Payer: MEDICAID

## 2024-04-18 ENCOUNTER — APPOINTMENT (OUTPATIENT)
Dept: INFECTIOUS DISEASES | Facility: CLINIC | Age: 54
End: 2024-04-18
Payer: MEDICAID

## 2024-05-02 ENCOUNTER — OFFICE VISIT (OUTPATIENT)
Dept: INFECTIOUS DISEASES | Facility: CLINIC | Age: 54
End: 2024-05-02
Payer: MEDICAID

## 2024-05-02 ENCOUNTER — LAB (OUTPATIENT)
Dept: LAB | Facility: LAB | Age: 54
End: 2024-05-02
Payer: MEDICAID

## 2024-05-02 VITALS
SYSTOLIC BLOOD PRESSURE: 104 MMHG | WEIGHT: 161 LBS | HEART RATE: 84 BPM | HEIGHT: 71 IN | BODY MASS INDEX: 22.54 KG/M2 | DIASTOLIC BLOOD PRESSURE: 67 MMHG

## 2024-05-02 DIAGNOSIS — M27.2 OSTEOMYELITIS OF MANDIBLE: ICD-10-CM

## 2024-05-02 DIAGNOSIS — M27.2 OSTEOMYELITIS OF MANDIBLE: Primary | ICD-10-CM

## 2024-05-02 LAB
ALBUMIN SERPL BCP-MCNC: 4.5 G/DL (ref 3.4–5)
ALP SERPL-CCNC: 72 U/L (ref 33–120)
ALT SERPL W P-5'-P-CCNC: 21 U/L (ref 10–52)
ANION GAP SERPL CALC-SCNC: 14 MMOL/L (ref 10–20)
AST SERPL W P-5'-P-CCNC: 21 U/L (ref 9–39)
BILIRUB SERPL-MCNC: 0.7 MG/DL (ref 0–1.2)
BUN SERPL-MCNC: 18 MG/DL (ref 6–23)
CALCIUM SERPL-MCNC: 9.3 MG/DL (ref 8.6–10.6)
CHLORIDE SERPL-SCNC: 101 MMOL/L (ref 98–107)
CO2 SERPL-SCNC: 27 MMOL/L (ref 21–32)
CREAT SERPL-MCNC: 0.82 MG/DL (ref 0.5–1.3)
CRP SERPL-MCNC: <0.1 MG/DL
EGFRCR SERPLBLD CKD-EPI 2021: >90 ML/MIN/1.73M*2
ERYTHROCYTE [DISTWIDTH] IN BLOOD BY AUTOMATED COUNT: 15.2 % (ref 11.5–14.5)
ERYTHROCYTE [SEDIMENTATION RATE] IN BLOOD BY WESTERGREN METHOD: 1 MM/H (ref 0–20)
GLUCOSE SERPL-MCNC: 83 MG/DL (ref 74–99)
HCT VFR BLD AUTO: 39.5 % (ref 41–52)
HGB BLD-MCNC: 12.9 G/DL (ref 13.5–17.5)
MCH RBC QN AUTO: 29.6 PG (ref 26–34)
MCHC RBC AUTO-ENTMCNC: 32.7 G/DL (ref 32–36)
MCV RBC AUTO: 91 FL (ref 80–100)
NRBC BLD-RTO: 0 /100 WBCS (ref 0–0)
PLATELET # BLD AUTO: 176 X10*3/UL (ref 150–450)
POTASSIUM SERPL-SCNC: 4.1 MMOL/L (ref 3.5–5.3)
PROT SERPL-MCNC: 7 G/DL (ref 6.4–8.2)
RBC # BLD AUTO: 4.36 X10*6/UL (ref 4.5–5.9)
SODIUM SERPL-SCNC: 138 MMOL/L (ref 136–145)
WBC # BLD AUTO: 5 X10*3/UL (ref 4.4–11.3)

## 2024-05-02 PROCEDURE — 85652 RBC SED RATE AUTOMATED: CPT

## 2024-05-02 PROCEDURE — 80053 COMPREHEN METABOLIC PANEL: CPT

## 2024-05-02 PROCEDURE — 36415 COLL VENOUS BLD VENIPUNCTURE: CPT

## 2024-05-02 PROCEDURE — 99214 OFFICE O/P EST MOD 30 MIN: CPT | Performed by: INTERNAL MEDICINE

## 2024-05-02 PROCEDURE — 86140 C-REACTIVE PROTEIN: CPT

## 2024-05-02 PROCEDURE — 3078F DIAST BP <80 MM HG: CPT | Performed by: INTERNAL MEDICINE

## 2024-05-02 PROCEDURE — 3074F SYST BP LT 130 MM HG: CPT | Performed by: INTERNAL MEDICINE

## 2024-05-02 PROCEDURE — 85027 COMPLETE CBC AUTOMATED: CPT

## 2024-05-02 ASSESSMENT — ENCOUNTER SYMPTOMS
MUSCULOSKELETAL NEGATIVE: 1
CONSTITUTIONAL NEGATIVE: 1
CARDIOVASCULAR NEGATIVE: 1
RESPIRATORY NEGATIVE: 1
GASTROINTESTINAL NEGATIVE: 1
EYES NEGATIVE: 1
PSYCHIATRIC NEGATIVE: 1
NEUROLOGICAL NEGATIVE: 1
ALLERGIC/IMMUNOLOGIC NEGATIVE: 1
HEMATOLOGIC/LYMPHATIC NEGATIVE: 1

## 2024-05-02 NOTE — PROGRESS NOTES
Infectious Diseases Clinic Follow-up:    Reason for Visit: No chief complaint on file.      History of Current Issue  Steven Olivia Jr. is a 53 y.o. year old male     Here for follow up. Initially saw 3 month ago for OM of mandible s/p reconstruction with hardware, details as below:    EXCERPTS FROM INITIAL ID CONSULT    A 53-year-old male with a complex medical history, including hypertension, coronary artery disease (CAD) post-CABG, partial colectomy (2015), and a perforated appendix, developed dental caries leading to abscesses. Amoxicillin-clavulanate was administered, and three lower right teeth were eventually extracted after complications. A CT scan revealed a plate and screws. Referred for further management, the patient underwent debridement of the mandible, placement of a custom reconstruction plate, teeth extraction, and possibly a nerve graft by Dr. Marinelli on 11/16/2023. The mandible biopsy reported the presence of mixed Gram-positive bacteria.     IMPRESSION  Mandibular OM, s/p reconstruction  with hardware     PLAN:  Amox-clav 875 mg PO BID x 6 weeks  This will be followed by Amox-clav 500 mg PO BID for chronic suppression  RTC in 3 mo for follow up     I spent 40 minutes in the professional and overall care of this patient.       Jayant Quintanilla MD MPH     PAST MEDICAL HISTORY:  Past Medical History:   Diagnosis Date    Alcoholism (Multi)     Cardiomyopathy (Multi) 04/07/2020    Carotid stenosis 04/08/2020    CHF (congestive heart failure) (Multi)     Coronary artery disease 04/09/2020    s/p CABGx3, Last ECG: Sinus Rhythm WNL on 09/04/2023 by Dr. Lei Pathak    HL (hearing loss)     Hyperlipidemia     Hypertension     ICAO (internal carotid artery occlusion), right     right ICA occlusion, 50-59% stenosis of left ICA    Myocardial infarction (Multi)     2020    Vision loss     glasses       PAST SURGICAL HISTORY:  Past Surgical History:   Procedure Laterality Date    CARDIAC CATHETERIZATION       04/2022 and 04/2020    COLECTOMY      COLONOSCOPY      CORONARY ARTERY BYPASS GRAFT  04/09/2020    CABGx3 with Dr. Sanchez       ALLERGIES:    No Known Allergies    MEDICATIONS:      Current Outpatient Medications:     aspirin 81 mg EC tablet, Take 1 tablet (81 mg) by mouth once daily., Disp: , Rfl:     atorvastatin (Lipitor) 80 mg tablet, Take 1 tablet (80 mg) by mouth once daily at bedtime., Disp: , Rfl:     ezetimibe (Zetia) 10 mg tablet, Take 1 tablet (10 mg) by mouth once daily., Disp: , Rfl:     lisinopriL-hydrochlorothiazide 20-12.5 mg tablet, Take 1 tablet by mouth once daily., Disp: , Rfl:     metoprolol succinate XL (Toprol-XL) 200 mg 24 hr tablet, Take 1 tablet (200 mg) by mouth once daily. DO NOT CRUSH OR CHEW, Disp: , Rfl:     pantoprazole (ProtoNix) 40 mg EC tablet, Take 1 tablet (40 mg) by mouth once daily in the morning. Take before meals., Disp: , Rfl:     REVIEW OF SYSTEMS:    Review of Systems   Constitutional: Negative.    HENT: Negative.     Eyes: Negative.    Respiratory: Negative.     Cardiovascular: Negative.    Gastrointestinal: Negative.    Genitourinary: Negative.    Musculoskeletal: Negative.    Skin: Negative.    Allergic/Immunologic: Negative.    Neurological: Negative.    Hematological: Negative.    Psychiatric/Behavioral: Negative.     All other systems reviewed and are negative.      PHYSICAL EXAMINATION:       Visit Vitals  Smoking Status Every Day        EXAM:   Physical Exam  Vitals reviewed.   Constitutional:       Appearance: Normal appearance.   HENT:      Head: Normocephalic.      Nose: Nose normal.   Eyes:      Extraocular Movements: Extraocular movements intact.      Pupils: Pupils are equal, round, and reactive to light.   Cardiovascular:      Rate and Rhythm: Normal rate and regular rhythm.   Pulmonary:      Effort: Pulmonary effort is normal.   Abdominal:      General: Abdomen is flat. Bowel sounds are normal.   Musculoskeletal:         General: Normal range of motion.       Cervical back: Normal range of motion.   Skin:     General: Skin is warm.   Neurological:      General: No focal deficit present.      Mental Status: He is alert.   Psychiatric:         Mood and Affect: Mood normal.         Behavior: Behavior normal.         Thought Content: Thought content normal.         Judgment: Judgment normal.      DATA:     Microbiology:   No results found for the last 90 days.       ASSESSMENT / RECOMMENDATIONS:  A 53-year-old male with a complex medical history, including hypertension, coronary artery disease (CAD) post-CABG, partial colectomy (2015), and a perforated appendix, developed dental caries leading to abscesses. Amoxicillin-clavulanate was administered, and three lower right teeth were eventually extracted after complications. A CT scan revealed a plate and screws. Referred for further management, the patient underwent debridement of the mandible, placement of a custom reconstruction plate, teeth extraction, and possibly a nerve graft by Dr. Marinelli on 11/16/2023. The mandible biopsy reported the presence of mixed Gram-positive bacteria.     IMPRESSION  1. Mandibular OM, s/p reconstruction  with hardware     UPDATE 5/02/2024  Did well on ABXs  Will now educe dose for chronic suppression    PLAN:  1. Amox-clav 875 mg PO daily  2. RTC in 3 mo for follow up     I spent 30minutes in the professional and overall care of this patient.         Jayant Quintanilla MD MPH

## 2024-05-02 NOTE — PROGRESS NOTES
Infectious Diseases Clinic Follow-up:        Reason for Consult: Osteomyelitis of mandible     HPI:  53-yo M with PMH of HTN, CAD, CABG, partial colectomy (approx 2015), perforated appendix, and dental caries over the past 6 months leading to abscess formation requiring an initial root canal, which became complicated. The patient was treated with amoxicillin-clavulanate for the infection and eventually underwent extraction of three lower right teeth. A CT scan revealed the presence of a plate and screws. The patient was then referred here for further management.    On 11/16/2023, the patient underwent debridement of the mandible, placement of a custom reconstruction plate, extraction of indicated teeth, and possibly a nerve graft by Dr. Marinelli. The mandible biopsy report indicated the presence of mixed Gram-positive bacteria.    The patient has a 15-year history of tobacco use, is a social drinker, denies intravenous drug use, is currently unemployed, and resides with his wife. He has no known drug allergies.    Last appointment was on 12/21/2023 and was given Augmentin x6 weeks with the plan to follow up in 3 months.     History of Current Issue  Steven Olivia Jr. is a 53 y.o. year old male     PAST MEDICAL HISTORY:  Past Medical History:   Diagnosis Date    Alcoholism (Multi)     Cardiomyopathy (Multi) 04/07/2020    Carotid stenosis 04/08/2020    CHF (congestive heart failure) (Multi)     Coronary artery disease 04/09/2020    s/p CABGx3, Last ECG: Sinus Rhythm WNL on 09/04/2023 by Dr. Lei Pathak    HL (hearing loss)     Hyperlipidemia     Hypertension     ICAO (internal carotid artery occlusion), right     right ICA occlusion, 50-59% stenosis of left ICA    Myocardial infarction (Multi)     2020    Vision loss     glasses     PAST SURGICAL HISTORY:  Past Surgical History:   Procedure Laterality Date    CARDIAC CATHETERIZATION      04/2022 and 04/2020    COLECTOMY      COLONOSCOPY      CORONARY ARTERY BYPASS GRAFT   04/09/2020    CABGx3 with Dr. Sanchez     ALLERGIES:    No Known Allergies    MEDICATIONS:      Current Outpatient Medications:     aspirin 81 mg EC tablet, Take 1 tablet (81 mg) by mouth once daily., Disp: , Rfl:     atorvastatin (Lipitor) 80 mg tablet, Take 1 tablet (80 mg) by mouth once daily at bedtime., Disp: , Rfl:     ezetimibe (Zetia) 10 mg tablet, Take 1 tablet (10 mg) by mouth once daily., Disp: , Rfl:     lisinopriL-hydrochlorothiazide 20-12.5 mg tablet, Take 1 tablet by mouth once daily., Disp: , Rfl:     metoprolol succinate XL (Toprol-XL) 200 mg 24 hr tablet, Take 1 tablet (200 mg) by mouth once daily. DO NOT CRUSH OR CHEW, Disp: , Rfl:     pantoprazole (ProtoNix) 40 mg EC tablet, Take 1 tablet (40 mg) by mouth once daily in the morning. Take before meals., Disp: , Rfl:     FAMILY HISTORY:   Family History   Problem Relation Name Age of Onset    Heart disease Mother      Heart disease Father          SOCIAL HISTORY:       Marital Status:  Tobacco / Smokeless tobacco/ Vaping:   reports that he has been smoking cigarettes. He has never used smokeless tobacco.   Alcohol:   Social History     Substance and Sexual Activity   Alcohol Use Yes      Drug Use:    Social History     Substance and Sexual Activity   Drug Use Not on file     IMMUNIZATIONS:    Immunization History   Administered Date(s) Administered    Moderna SARS-CoV-2 Vaccination 04/13/2021, 05/15/2021       REVIEW OF SYSTEMS:  Review of Systems   Constitutional: Negative.    HENT: Negative.     Eyes: Negative.    Respiratory: Negative.     Cardiovascular: Negative.    Gastrointestinal: Negative.    Genitourinary: Negative.    Musculoskeletal: Negative.    Skin: Negative.    Allergic/Immunologic: Negative.    Neurological: Negative.    Hematological: Negative.    Psychiatric/Behavioral: Negative.     All other systems reviewed and are negative.      PHYSICAL EXAMINATION:     Visit Vitals  Smoking Status Every Day        EXAM:  Physical  Exam  Vitals reviewed.   Constitutional:       Appearance: Normal appearance.   HENT:      Head: Normocephalic.      Nose: Nose normal.   Eyes:      Extraocular Movements: Extraocular movements intact.      Pupils: Pupils are equal, round, and reactive to light.   Cardiovascular:      Rate and Rhythm: Normal rate and regular rhythm.   Pulmonary:      Effort: Pulmonary effort is normal.   Abdominal:      General: Abdomen is flat. Bowel sounds are normal.   Musculoskeletal:         General: Normal range of motion.      Cervical back: Normal range of motion.   Skin:     General: Skin is warm.   Neurological:      General: No focal deficit present.      Mental Status: He is alert.   Psychiatric:         Mood and Affect: Mood normal.         Behavior: Behavior normal.         Thought Content: Thought content normal.         Judgment: Judgment normal.          DATA:    Microbiology:   No results found for the last 90 days.       Imaging Studies:    CT FACIAL 9/14/2023  IMPRESSION:  1.  Irregular hypodensity/lucencies with erosion of the outer and  inner cortical table are present in the right mandible, contiguous  with the roots of the 1st right mandibular molar, representing  periapical abscesses/osteomyelitis in the appropriate clinical  setting. Small amount of nonspecific air is present in the adjacent  buccal soft tissues, without evidence of buckle fluid collections or  edema.  2. Several additional subtle periapical lucencies are present in the  right maxillary tooth roots, incompletely characterized on current  study. Correlation with dental exam is recommended.      ASSESSMENT / RECOMMENDATIONS:  A 53-year-old male with a complex medical history, including hypertension, coronary artery disease (CAD) post-CABG, partial colectomy (2015), and a perforated appendix, developed dental caries leading to abscesses. Amoxicillin-clavulanate was administered, and three lower right teeth were eventually extracted after  complications. A CT scan revealed a plate and screws. Referred for further management, the patient underwent debridement of the mandible, placement of a custom reconstruction plate, teeth extraction, and possibly a nerve graft by Dr. Marinelli on 11/16/2023. The mandible biopsy reported the presence of mixed Gram-positive bacteria and rare gram negative bacilli.  Biopsy sgowed Acute on chronic OM with bone necrosis.     IMPRESSION  Mandibular OM, s/p reconstruction  with hardware    PLAN:  Amox-clav 875 mg PO BID x 6 weeks  This will be followed by Amox-clav 500 mg PO BID for chronic suppression  RTC in 3 mo for follow up    I spent 40 minutes in the professional and overall care of this patient.      Bonita Walker MD

## 2024-06-19 DIAGNOSIS — M27.2 OSTEOMYELITIS OF MANDIBLE: Primary | ICD-10-CM

## 2024-06-19 RX ORDER — AMOXICILLIN AND CLAVULANATE POTASSIUM 500; 125 MG/1; MG/1
1 TABLET, FILM COATED ORAL 2 TIMES DAILY
Qty: 60 TABLET | Refills: 2 | Status: SHIPPED | OUTPATIENT
Start: 2024-06-19 | End: 2024-09-17

## 2025-03-10 ENCOUNTER — TELEPHONE (OUTPATIENT)
Dept: INFECTIOUS DISEASES | Facility: HOSPITAL | Age: 55
End: 2025-03-10
Payer: MEDICAID

## 2025-03-10 NOTE — TELEPHONE ENCOUNTER
Mr. Olivia (matt: [phone]) is asking for an appointment for you to evaluate his jaw as soon as possible. His current symptoms are cramping in the jaw that pulls his face to the neck. He has a strange feeling on the jaw around the area of surgery. He denies fever. Does he need any lab work to determine if infection is stirring? He doesn't feel he needs to contact the surgeon since you are the one that has him on Augmentin. He works nights, so if no answer when calling patient please leave a detailed voice message.

## 2025-03-12 NOTE — TELEPHONE ENCOUNTER
At the direction of Dr. Quintanilla, the patient has been scheduled to have a virtual video appointment with Dr. Quintanilla on March 14, 2025, at 11:00 AM.

## 2025-03-14 ENCOUNTER — APPOINTMENT (OUTPATIENT)
Dept: INFECTIOUS DISEASES | Facility: CLINIC | Age: 55
End: 2025-03-14
Payer: MEDICAID

## 2025-03-18 DIAGNOSIS — M27.2 OSTEOMYELITIS OF MANDIBLE: Primary | ICD-10-CM

## 2025-03-21 RX ORDER — AMOXICILLIN AND CLAVULANATE POTASSIUM 500; 125 MG/1; MG/1
1 TABLET, FILM COATED ORAL 2 TIMES DAILY
Qty: 60 TABLET | Refills: 1 | Status: SHIPPED | OUTPATIENT
Start: 2025-03-21

## 2025-04-07 ENCOUNTER — TELEPHONE (OUTPATIENT)
Dept: INFECTIOUS DISEASES | Facility: HOSPITAL | Age: 55
End: 2025-04-07
Payer: MEDICAID

## 2025-04-07 NOTE — TELEPHONE ENCOUNTER
Patient called complaining of having issues with his right jaw. States he has a cramp in it almost like a gerson horse it tingles and is afraid he may be developing an infection again please advise patient is asking to be called.

## 2025-04-08 ENCOUNTER — DOCUMENTATION (OUTPATIENT)
Dept: INFECTIOUS DISEASES | Facility: HOSPITAL | Age: 55
End: 2025-04-08
Payer: MEDICAID

## 2025-04-08 NOTE — PROGRESS NOTES
Returned patient’s call but reached voicemail. Advised that if there is concern for a new infection, this will require in-person evaluation. Recommended presenting to the Emergency Department or contacting ENT surgery for assessment of the need for further imaging (e.g., CT scan) or possible surgical intervention.

## 2025-05-22 ENCOUNTER — APPOINTMENT (OUTPATIENT)
Dept: INFECTIOUS DISEASES | Facility: CLINIC | Age: 55
End: 2025-05-22
Payer: MEDICAID

## 2025-06-02 ENCOUNTER — APPOINTMENT (OUTPATIENT)
Dept: INFECTIOUS DISEASES | Facility: CLINIC | Age: 55
End: 2025-06-02
Payer: MEDICAID

## (undated) DEVICE — COVER, CART, 45 X 27 X 48 IN, CLEAR

## (undated) DEVICE — SUTURE, SILK, 2-0, 18 IN, BLACK

## (undated) DEVICE — SPONGE, DISSECTOR, PEANUT, 3/8, STERILE 5 FOAM HOLDER"

## (undated) DEVICE — DRAPE, TOWEL, STERI DRAPE, 17 X 11 IN, PLASTIC, STERILE

## (undated) DEVICE — BLADE, OPHTHALMIC, BEAVER, MINI, SHARP ONE SIDE

## (undated) DEVICE — SUTURE, PROLENE, 5-0, 18 IN, P3, BLUE

## (undated) DEVICE — CONTAINER, SPECIMEN, 120 ML, STERILE

## (undated) DEVICE — Device

## (undated) DEVICE — SUTURE, SILK, 2-0, 30 IN, SH, BLACK

## (undated) DEVICE — SEALANT, HEMOSTATIC, FLOSEAL, 10 ML

## (undated) DEVICE — TUBE, SALEM SUMP, 16 FR X 48IN, ENFIT

## (undated) DEVICE — CATHETER TRAY, SURESTEP, 16FR, URINE METER W/STATLOCK

## (undated) DEVICE — DRESSING, TRANSPARENT, TEGADERM, 4 X 4-3/4 IN

## (undated) DEVICE — SUTURE, POLYSORB, 4-0, 18 IN, P12, UNDYED

## (undated) DEVICE — DRESSING, GAUZE, DRAIN SPONGE, 6 PLY, EXCILON, 4 X 4 IN, STERILE

## (undated) DEVICE — GOWN, ASTOUND, XL

## (undated) DEVICE — DRAIN, PENROSE, XRAY, 1/4 X 18 IN, LF

## (undated) DEVICE — BOWL, BASIN, 32 OZ, STERILE

## (undated) DEVICE — DRAPE, TIBURON W/ADHESIVE, 19 X 30

## (undated) DEVICE — DRESSING, NON-ADHERENT, TELFA, OUCHLESS, 3 X 8 IN, STERILE

## (undated) DEVICE — TRAY, MINOR, SINGLE BASIN, STERILE

## (undated) DEVICE — DRESSING, SPONGE, GAUZE, CURITY, 4 X 4 IN, STERILE

## (undated) DEVICE — DRESSING, GAUZE, PETROLATUM, PATCH, XEROFORM, 1 X 8 IN, STERILE

## (undated) DEVICE — CASSETTE, SONOPET IQ IRRIGATION SUCTION

## (undated) DEVICE — SUTURE, VICRYL, 3-0,18 IN, SH, UNDYED

## (undated) DEVICE — MANIFOLD, 4 PORT NEPTUNE STANDARD

## (undated) DEVICE — DRILL BIT, 4MM EGG

## (undated) DEVICE — TIP, SONOPET IQ, STANDARD, ROUND, 12CM

## (undated) DEVICE — BANDAGE, GAUZE, CONFORMING, KERLIX, 6 PLY, 4.5 IN X 4.1 YD

## (undated) DEVICE — REST, HEAD, BAGEL, 9 IN

## (undated) DEVICE — SUPPORT, FACIOPLASTY, CROWN/CHIIN, LARGE, OVER 27 IN, ELASTIC

## (undated) DEVICE — SUCTION, VERSALIGHT MINI W/EXTENDED FRAZIER TIP